# Patient Record
Sex: FEMALE | Race: BLACK OR AFRICAN AMERICAN | Employment: FULL TIME | URBAN - METROPOLITAN AREA
[De-identification: names, ages, dates, MRNs, and addresses within clinical notes are randomized per-mention and may not be internally consistent; named-entity substitution may affect disease eponyms.]

---

## 2018-03-06 ENCOUNTER — OFFICE VISIT (OUTPATIENT)
Dept: INTERNAL MEDICINE CLINIC | Age: 43
End: 2018-03-06

## 2018-03-06 VITALS
RESPIRATION RATE: 16 BRPM | HEIGHT: 68 IN | OXYGEN SATURATION: 96 % | DIASTOLIC BLOOD PRESSURE: 78 MMHG | BODY MASS INDEX: 34.69 KG/M2 | HEART RATE: 71 BPM | WEIGHT: 228.9 LBS | SYSTOLIC BLOOD PRESSURE: 130 MMHG | TEMPERATURE: 98.2 F

## 2018-03-06 DIAGNOSIS — E66.9 CLASS 1 OBESITY WITH SERIOUS COMORBIDITY AND BODY MASS INDEX (BMI) OF 34.0 TO 34.9 IN ADULT, UNSPECIFIED OBESITY TYPE: ICD-10-CM

## 2018-03-06 DIAGNOSIS — I10 ESSENTIAL HYPERTENSION: ICD-10-CM

## 2018-03-06 DIAGNOSIS — Z00.00 PHYSICAL EXAM: Primary | ICD-10-CM

## 2018-03-06 RX ORDER — HYDROCHLOROTHIAZIDE 25 MG/1
25 TABLET ORAL DAILY
Qty: 90 TAB | Refills: 3 | Status: SHIPPED | OUTPATIENT
Start: 2018-03-06 | End: 2021-01-18 | Stop reason: ALTCHOICE

## 2018-03-06 RX ORDER — HYDROCHLOROTHIAZIDE 25 MG/1
TABLET ORAL
Refills: 0 | COMMUNITY
Start: 2018-02-19 | End: 2018-03-06 | Stop reason: SDUPTHER

## 2018-03-06 NOTE — MR AVS SNAPSHOT
[address] [place] [id number] 
[phone] Patient: Pipe Thompson MRN: [id number] :1975 Visit Information Date & Time Provider Department Dept. Phone Encounter #  
 3/6/2018  2:30 PM Latanya Martinez NP [address] [facility] [id number] Follow-up Instructions Return in about 4 months (around 2018), or if symptoms worsen or fail to improve, for f/u 4 months for HTN/BP evaluation. Upcoming Health Maintenance Date Due DTaP/Tdap/Td series (1 - Tdap) 10/26/1998 PAP AKA CERVICAL CYTOLOGY 1/10/2021 Allergies as of 3/6/2018  Review Complete On: 3/6/2018 By: Latanya Martinez NP Severity Noted Reaction Type Reactions Shellfish Derived Medium 2013   Systemic Hives, Swelling Current Immunizations  Reviewed on 2013 No immunizations on file. Not reviewed this visit You Were Diagnosed With   
  
 Codes Comments Physical exam    -  Primary ICD-10-CM: Z00.00 ICD-9-CM: V70.9 Essential hypertension     ICD-10-CM: I10 
ICD-9-CM: 401.9 Class 1 obesity with serious comorbidity and body mass index (BMI) of 34.0 to 34.9 in adult, unspecified obesity type     ICD-10-CM: E66.9, Z68.34 
ICD-9-CM: 278.00, V85.34 Vitals BP Pulse Temp Resp Height(growth percentile) Weight(growth percentile) 130/78 (BP 1 Location: Left arm, BP Patient Position: Sitting) 71 98.2 °F (36.8 °C) (Oral) 16 5' 8\" (1.727 m) 228 lb 14.4 oz (103.8 kg) LMP SpO2 Breastfeeding? BMI OB Status Smoking Status 2018 96% No 34.8 kg/m2 Having regular periods Never Smoker Vitals History BMI and BSA Data Body Mass Index Body Surface Area 34.8 kg/m 2 2.23 m 2 Preferred Pharmacy Pharmacy Name Phone [id number] ENELIMA Ambriz  [address] [phone] Your Updated Medication List  
  
   
 This list is accurate as of 3/6/18  3:25 PM.  Always use your most recent med list.  
  
  
  
  
 hydroCHLOROthiazide 25 mg tablet Commonly known as:  HYDRODIURIL Take 1 Tab by mouth daily. prenatal multivit-ca-min-fe-fa Tab Take  by mouth. Indications: PREGNANCY  
  
 VITAMIN D3 1,000 unit Cap Generic drug:  cholecalciferol Take  by mouth. Indications: VITAMIN D DEFICIENCY Prescriptions Sent to Pharmacy Refills  
 hydroCHLOROthiazide (HYDRODIURIL) 25 mg tablet 3 Sig: Take 1 Tab by mouth daily. Class: Normal  
 Pharmacy: [id number] Vinny Ibarra [address] #: [phone] Route: Oral  
  
We Performed the Following REFERRAL TO WEIGHT LOSS [GXK307 Custom] Comments:  
 Evaluate for weight loss program  
  
Follow-up Instructions Return in about 4 months (around 7/6/2018), or if symptoms worsen or fail to improve, for f/u 4 months for HTN/BP evaluation. Referral Information Referral ID Referred By Referred To  
  
 [id number] CHANG Foreman MD   
   [address] [place] [address] Phone: [phone] Fax: [phone] Visits Status Start Date End Date 1 New Request 3/6/18 3/6/19 If your referral has a status of pending review or denied, additional information will be sent to support the outcome of this decision. Patient Instructions High Blood Pressure: Care Instructions Your Care Instructions If your blood pressure is usually above 140/90, you have high blood pressure, or hypertension. That means the top number is 140 or higher or the bottom number is 90 or higher, or both. Despite what a lot of people think, high blood pressure usually doesn't cause headaches or make you feel dizzy or lightheaded. It usually has no symptoms.  But it does increase your risk for heart attack, stroke, and kidney or eye damage. The higher your blood pressure, the more your risk increases. Your doctor will give you a goal for your blood pressure. Your goal will be based on your health and your age. An example of a goal is to keep your blood pressure below 140/90. Lifestyle changes, such as eating healthy and being active, are always important to help lower blood pressure. You might also take medicine to reach your blood pressure goal. 
Follow-up care is a key part of your treatment and safety. Be sure to make and go to all appointments, and call your doctor if you are having problems. It's also a good idea to know your test results and keep a list of the medicines you take. How can you care for yourself at home? Medical treatment · If you stop taking your medicine, your blood pressure will go back up. You may take one or more types of medicine to lower your blood pressure. Be safe with medicines. Take your medicine exactly as prescribed. Call your doctor if you think you are having a problem with your medicine. · Talk to your doctor before you start taking aspirin every day. Aspirin can help certain people lower their risk of a heart attack or stroke. But taking aspirin isn't right for everyone, because it can cause serious bleeding. · See your doctor regularly. You may need to see the doctor more often at first or until your blood pressure comes down. · If you are taking blood pressure medicine, talk to your doctor before you take decongestants or anti-inflammatory medicine, such as ibuprofen. Some of these medicines can raise blood pressure. · Learn how to check your blood pressure at home. Lifestyle changes · Stay at a healthy weight. This is especially important if you put on weight around the waist. Losing even 10 pounds can help you lower your blood pressure. · If your doctor recommends it, get more exercise. Walking is a good choice. Bit by bit, increase the amount you walk every day.  Try for at least 30 minutes on most days of the week. You also may want to swim, bike, or do other activities. · Avoid or limit alcohol. Talk to your doctor about whether you can drink any alcohol. · Try to limit how much sodium you eat to less than 2,300 milligrams (mg) a day. Your doctor may ask you to try to eat less than 1,500 mg a day. · Eat plenty of fruits (such as bananas and oranges), vegetables, legumes, whole grains, and low-fat dairy products. · Lower the amount of saturated fat in your diet. Saturated fat is found in animal products such as milk, cheese, and meat. Limiting these foods may help you lose weight and also lower your risk for heart disease. · Do not smoke. Smoking increases your risk for heart attack and stroke. If you need help quitting, talk to your doctor about stop-smoking programs and medicines. These can increase your chances of quitting for good. When should you call for help? Call 911 anytime you think you may need emergency care. This may mean having symptoms that suggest that your blood pressure is causing a serious heart or blood vessel problem. Your blood pressure may be over 180/110. ? For example, call 911 if: 
? · You have symptoms of a heart attack. These may include: ¨ Chest pain or pressure, or a strange feeling in the chest. 
¨ Sweating. ¨ Shortness of breath. ¨ Nausea or vomiting. ¨ Pain, pressure, or a strange feeling in the back, neck, jaw, or upper belly or in one or both shoulders or arms. ¨ Lightheadedness or sudden weakness. ¨ A fast or irregular heartbeat. ? · You have symptoms of a stroke. These may include: 
¨ Sudden numbness, tingling, weakness, or loss of movement in your face, arm, or leg, especially on only one side of your body. ¨ Sudden vision changes. ¨ Sudden trouble speaking. ¨ Sudden confusion or trouble understanding simple statements. ¨ Sudden problems with walking or balance. ¨ A sudden, severe headache that is different from past headaches. ? · You have severe back or belly pain. ?Do not wait until your blood pressure comes down on its own. Get help right away. ?Call your doctor now or seek immediate care if: 
? · Your blood pressure is much higher than normal (such as 180/110 or higher), but you don't have symptoms. ? · You think high blood pressure is causing symptoms, such as: ¨ Severe headache. ¨ Blurry vision. ? Watch closely for changes in your health, and be sure to contact your doctor if: 
? · Your blood pressure measures 140/90 or higher at least 2 times. That means the top number is 140 or higher or the bottom number is 90 or higher, or both. ? · You think you may be having side effects from your blood pressure medicine. ? · Your blood pressure is usually normal, but it goes above normal at least 2 times. Where can you learn more? Go to http://teofilo-shabana.info/. Enter A482 in the search box to learn more about \"High Blood Pressure: Care Instructions. \" Current as of: September 21, 2016 Content Version: 11.4 © 6309-6446 [employer]. Care instructions adapted under license by [employer] (which disclaims liability or warranty for this information). If you have questions about a medical condition or this instruction, always ask your healthcare professional. [address] any warranty or liability for your use of this information. Learning About Obesity What is obesity? Obesity means having so much body fat that your health is in danger. Having too much body fat can lead to type 2 diabetes, heart disease, high blood pressure, arthritis, sleep apnea, and stroke. Even if you don't feel bad now, think about these health risks. Do they seem like a good reason to start on a new path toward a healthier weight?  Or do you have another personal, powerful reason for wanting to lose weight? Whatever it is, keep it in mind. It can be hard to change eating habits and exercise habits. But with your own reason and plan, you can do it. How do you know if your weight is in the obesity range? To know if your weight is in the obesity range, your doctor looks at your body mass index (BMI) and waist size. Your BMI is a number that is calculated from your weight and your height. To figure your BMI for yourself, get a BMI table from your doctor or use an online tool, such as http://www.dietrich.[facility]/ on the [employer] of [employer]. What causes obesity? When you take in more calories than you burn off, you gain weight. How you eat, how active you are, and other things affect how your body uses calories and whether you gain weight. If you have family members who have too much body fat, you may have inherited a tendency to gain weight. And your family also helps form your eating and lifestyle habits, which can lead to obesity. Also, our busy lives make it harder to plan and cook healthy meals. For many of us, it's easier to reach for prepared foods, go out to eat, or go to the drive-through. But these foods are often high in saturated fat and calories. Portions are often too large. What can you do to reach a healthy weight? Focus on health, not diets. Diets are hard to stay on and don't work in the long run. It is very hard to stay with a diet that includes lots of big changes in your eating habits. Instead of a diet, focus on lifestyle changes that will improve your health and achieve the right balance of energy and calories. To lose weight, you need to burn more calories than you take in. You can do it by eating healthy foods in reasonable amounts and becoming more active, even a little bit every day. Making small changes over time can add up to a lot. Make a plan for change.  Many people have found that naming their reasons for change and staying focused on their plan can make a big difference. Work with your doctor to create a plan that is right for you. · Ask yourself: Vinh Posada are my personal, most powerful reasons for wanting this change? What will my life look like when I've made the change? \" · Set your long-term goal. Make it specific, such as \"I will lose x pounds. \" 
· Break your long-term goal into smaller, short-term goals. Make these small steps specific and within your reach, things you know you can do. These steps are what keep you going from day to day. How can you stay on your plan for change? Be ready. Choose to start during a time when there are few events that might trigger slip-ups, like holidays, social events, and high-stress periods. Decide on your first few steps. Most people have more success when they make small changes, one step at a time. For example, you might switch a daily candy bar to a piece of fruit, walk 10 minutes more, or add more vegetables to a meal. 
Line up your support people. Make sure you're not going to be alone as you make this change. Connect with people who understand how important it is to you. Ask family members and friends for help in keeping with your plan. And think about who could make it harder for you, and how to handle them. Try tracking. People who keep track of what they eat, feel, and do are better at losing weight. Try writing down things like: · What and how much you eat. · How you feel before and after each meal. 
· Details about each meal (like eating out or at home, eating alone, or with friends or family). · What you do to be active. Look and plan. As you track, look for patterns that you may want to change. Take note of: · When you eat and whether you skip meals. · How often you eat out. · How many fruits and vegetables you eat. · When you eat beyond feeling full. · When and why you eat for reasons other than being hungry. When you stray from your plan, don't get upset. Figure out what made you slip up and how you can fix it. Can you take medicines or have surgery to lose weight? Before your doctor will prescribe medicines or surgery, he or she will probably want you to be more active and follow your healthy eating plan for a period of time. These habits are key lifelong changes for managing your weight, with or without other medical treatment. And these changes can help you avoid weight-related health problems. Follow-up care is a key part of your treatment and safety. Be sure to make and go to all appointments, and call your doctor if you are having problems. It's also a good idea to know your test results and keep a list of the medicines you take. Where can you learn more? Go to http://teofilo-shabana.info/. Enter N111 in the search box to learn more about \"Learning About Obesity. \" Current as of: October 13, 2016 Content Version: 11.4 © 6562-2291 [employer]. Care instructions adapted under license by [employer] (which disclaims liability or warranty for this information). If you have questions about a medical condition or this instruction, always ask your healthcare professional. [place] [address] any warranty or liability for your use of this information. Well Visit, Ages 25 to 48: Care Instructions Your Care Instructions Physical exams can help you stay healthy. Your doctor has checked your overall health and may have suggested ways to take good care of yourself. He or she also may have recommended tests. At home, you can help prevent illness with healthy eating, regular exercise, and other steps. Follow-up care is a key part of your treatment and safety. Be sure to make and go to all appointments, and call your doctor if you are having problems. It's also a good idea to know your test results and keep a list of the medicines you take. How can you care for yourself at home? · Reach and stay at a healthy weight. This will lower your risk for many problems, such as obesity, diabetes, heart disease, and high blood pressure. · Get at least 30 minutes of physical activity on most days of the week. Walking is a good choice. You also may want to do other activities, such as running, swimming, cycling, or playing tennis or team sports. Discuss any changes in your exercise program with your doctor. · Do not smoke or allow others to smoke around you. If you need help quitting, talk to your doctor about stop-smoking programs and medicines. These can increase your chances of quitting for good. · Talk to your doctor about whether you have any risk factors for sexually transmitted infections (STIs). Having one sex partner (who does not have STIs and does not have sex with anyone else) is a good way to avoid these infections. · Use birth control if you do not want to have children at this time. Talk with your doctor about the choices available and what might be best for you. · Protect your skin from too much sun. When you're outdoors from 10 a.m. to 4 p.m., stay in the shade or cover up with clothing and a hat with a wide brim. Wear sunglasses that block UV rays. Even when it's cloudy, put broad-spectrum sunscreen (SPF 30 or higher) on any exposed skin. · See a dentist one or two times a year for checkups and to have your teeth cleaned. · Wear a seat belt in the car. · Drink alcohol in moderation, if at all. That means no more than 2 drinks a day for men and 1 drink a day for women. Follow your doctor's advice about when to have certain tests. These tests can spot problems early. For everyone · Cholesterol. Have the fat (cholesterol) in your blood tested after age 21. Your doctor will tell you how often to have this done based on your age, family history, or other things that can increase your risk for heart disease. · Blood pressure. Have your blood pressure checked during a routine doctor visit. Your doctor will tell you how often to check your blood pressure based on your age, your blood pressure results, and other factors. · Vision. Talk with your doctor about how often to have a glaucoma test. 
· Diabetes. Ask your doctor whether you should have tests for diabetes. · Colon cancer. Have a test for colon cancer at age 48. You may have one of several tests. If you are younger than 48, you may need a test earlier if you have any risk factors. Risk factors include whether you already had a precancerous polyp removed from your colon or whether your parent, brother, sister, or child has had colon cancer. For women · Breast exam and mammogram. Talk to your doctor about when you should have a clinical breast exam and a mammogram. Medical experts differ on whether and how often women under 50 should have these tests. Your doctor can help you decide what is right for you. · Pap test and pelvic exam. Begin Pap tests at age 24. A Pap test is the best way to find cervical cancer. The test often is part of a pelvic exam. Ask how often to have this test. 
· Tests for sexually transmitted infections (STIs). Ask whether you should have tests for STIs. You may be at risk if you have sex with more than one person, especially if your partners do not wear condoms. For men · Tests for sexually transmitted infections (STIs). Ask whether you should have tests for STIs. You may be at risk if you have sex with more than one person, especially if you do not wear a condom. · Testicular cancer exam. Ask your doctor whether you should check your testicles regularly. · Prostate exam. Talk to your doctor about whether you should have a blood test (called a PSA test) for prostate cancer.  Experts differ on whether and when men should have this test. Some experts suggest it if you are older than 39 and are -American or have a father or brother who got prostate cancer when he was younger than 72. When should you call for help? Watch closely for changes in your health, and be sure to contact your doctor if you have any problems or symptoms that concern you. Where can you learn more? Go to http://teofilo-shabana.info/. Enter P072 in the search box to learn more about \"Well Visit, Ages 25 to 48: Care Instructions. \" Current as of: May 12, 2017 Content Version: 11.4 © 3476-3297 [employer]. Care instructions adapted under license by [employer] (which disclaims liability or warranty for this information). If you have questions about a medical condition or this instruction, always ask your healthcare professional. [address] any warranty or liability for your use of this information. Introducing [facility]! Dear Nicci Munoz: Thank you for requesting a [employer] account. Our records indicate that you already have an active [employer] account. You can access your account anytime at [email or web address] Did you know that you can access your hospital and ER discharge instructions at any time in [employer]? You can also review all of your test results from your hospital stay or ER visit. Additional Information If you have questions, please visit the Frequently Asked Questions section of the [employer] website at [email or web address] Remember, [employer] is NOT to be used for urgent needs. For medical emergencies, dial 911. Now available from your iPhone and Android! Please provide this summary of care documentation to your next provider. Your primary care clinician is listed as Vicki Morse. If you have any questions after today's visit, please call [phone].

## 2018-03-06 NOTE — PATIENT INSTRUCTIONS
High Blood Pressure: Care Instructions  Your Care Instructions    If your blood pressure is usually above 140/90, you have high blood pressure, or hypertension. That means the top number is 140 or higher or the bottom number is 90 or higher, or both. Despite what a lot of people think, high blood pressure usually doesn't cause headaches or make you feel dizzy or lightheaded. It usually has no symptoms. But it does increase your risk for heart attack, stroke, and kidney or eye damage. The higher your blood pressure, the more your risk increases. Your doctor will give you a goal for your blood pressure. Your goal will be based on your health and your age. An example of a goal is to keep your blood pressure below 140/90. Lifestyle changes, such as eating healthy and being active, are always important to help lower blood pressure. You might also take medicine to reach your blood pressure goal.  Follow-up care is a key part of your treatment and safety. Be sure to make and go to all appointments, and call your doctor if you are having problems. It's also a good idea to know your test results and keep a list of the medicines you take. How can you care for yourself at home? Medical treatment  · If you stop taking your medicine, your blood pressure will go back up. You may take one or more types of medicine to lower your blood pressure. Be safe with medicines. Take your medicine exactly as prescribed. Call your doctor if you think you are having a problem with your medicine. · Talk to your doctor before you start taking aspirin every day. Aspirin can help certain people lower their risk of a heart attack or stroke. But taking aspirin isn't right for everyone, because it can cause serious bleeding. · See your doctor regularly. You may need to see the doctor more often at first or until your blood pressure comes down.   · If you are taking blood pressure medicine, talk to your doctor before you take decongestants or anti-inflammatory medicine, such as ibuprofen. Some of these medicines can raise blood pressure. · Learn how to check your blood pressure at home. Lifestyle changes  · Stay at a healthy weight. This is especially important if you put on weight around the waist. Losing even 10 pounds can help you lower your blood pressure. · If your doctor recommends it, get more exercise. Walking is a good choice. Bit by bit, increase the amount you walk every day. Try for at least 30 minutes on most days of the week. You also may want to swim, bike, or do other activities. · Avoid or limit alcohol. Talk to your doctor about whether you can drink any alcohol. · Try to limit how much sodium you eat to less than 2,300 milligrams (mg) a day. Your doctor may ask you to try to eat less than 1,500 mg a day. · Eat plenty of fruits (such as bananas and oranges), vegetables, legumes, whole grains, and low-fat dairy products. · Lower the amount of saturated fat in your diet. Saturated fat is found in animal products such as milk, cheese, and meat. Limiting these foods may help you lose weight and also lower your risk for heart disease. · Do not smoke. Smoking increases your risk for heart attack and stroke. If you need help quitting, talk to your doctor about stop-smoking programs and medicines. These can increase your chances of quitting for good. When should you call for help? Call 911 anytime you think you may need emergency care. This may mean having symptoms that suggest that your blood pressure is causing a serious heart or blood vessel problem. Your blood pressure may be over 180/110. ? For example, call 911 if:  ? · You have symptoms of a heart attack. These may include:  ¨ Chest pain or pressure, or a strange feeling in the chest.  ¨ Sweating. ¨ Shortness of breath. ¨ Nausea or vomiting.   ¨ Pain, pressure, or a strange feeling in the back, neck, jaw, or upper belly or in one or both shoulders or arms.  ¨ Lightheadedness or sudden weakness. ¨ A fast or irregular heartbeat. ? · You have symptoms of a stroke. These may include:  ¨ Sudden numbness, tingling, weakness, or loss of movement in your face, arm, or leg, especially on only one side of your body. ¨ Sudden vision changes. ¨ Sudden trouble speaking. ¨ Sudden confusion or trouble understanding simple statements. ¨ Sudden problems with walking or balance. ¨ A sudden, severe headache that is different from past headaches. ? · You have severe back or belly pain. ?Do not wait until your blood pressure comes down on its own. Get help right away. ?Call your doctor now or seek immediate care if:  ? · Your blood pressure is much higher than normal (such as 180/110 or higher), but you don't have symptoms. ? · You think high blood pressure is causing symptoms, such as:  ¨ Severe headache. ¨ Blurry vision. ? Watch closely for changes in your health, and be sure to contact your doctor if:  ? · Your blood pressure measures 140/90 or higher at least 2 times. That means the top number is 140 or higher or the bottom number is 90 or higher, or both. ? · You think you may be having side effects from your blood pressure medicine. ? · Your blood pressure is usually normal, but it goes above normal at least 2 times. Where can you learn more? Go to http://teofilo-shabana.info/. Enter K863 in the search box to learn more about \"High Blood Pressure: Care Instructions. \"  Current as of: September 21, 2016  Content Version: 11.4  © 4116-3935 [employer]. Care instructions adapted under license by [employer] (which disclaims liability or warranty for this information). If you have questions about a medical condition or this instruction, always ask your healthcare professional. [place] [address] any warranty or liability for your use of this information.        Learning About Obesity  What is obesity? Obesity means having so much body fat that your health is in danger. Having too much body fat can lead to type 2 diabetes, heart disease, high blood pressure, arthritis, sleep apnea, and stroke. Even if you don't feel bad now, think about these health risks. Do they seem like a good reason to start on a new path toward a healthier weight? Or do you have another personal, powerful reason for wanting to lose weight? Whatever it is, keep it in mind. It can be hard to change eating habits and exercise habits. But with your own reason and plan, you can do it. How do you know if your weight is in the obesity range? To know if your weight is in the obesity range, your doctor looks at your body mass index (BMI) and waist size. Your BMI is a number that is calculated from your weight and your height. To figure your BMI for yourself, get a BMI table from your doctor or use an online tool, such as http://www.dietrich.com/ on the [employer]. What causes obesity? When you take in more calories than you burn off, you gain weight. How you eat, how active you are, and other things affect how your body uses calories and whether you gain weight. If you have family members who have too much body fat, you may have inherited a tendency to gain weight. And your family also helps form your eating and lifestyle habits, which can lead to obesity. Also, our busy lives make it harder to plan and cook healthy meals. For many of us, it's easier to reach for prepared foods, go out to eat, or go to the drive-through. But these foods are often high in saturated fat and calories. Portions are often too large. What can you do to reach a healthy weight? Focus on health, not diets. Diets are hard to stay on and don't work in the long run. It is very hard to stay with a diet that includes lots of big changes in your eating habits.   Instead of a diet, focus on lifestyle changes that will improve your health and achieve the right balance of energy and calories. To lose weight, you need to burn more calories than you take in. You can do it by eating healthy foods in reasonable amounts and becoming more active, even a little bit every day. Making small changes over time can add up to a lot. Make a plan for change. Many people have found that naming their reasons for change and staying focused on their plan can make a big difference. Work with your doctor to create a plan that is right for you. · Ask yourself: Lary Dominguez are my personal, most powerful reasons for wanting this change? What will my life look like when I've made the change? \"  · Set your long-term goal. Make it specific, such as \"I will lose x pounds. \"  · Break your long-term goal into smaller, short-term goals. Make these small steps specific and within your reach, things you know you can do. These steps are what keep you going from day to day. How can you stay on your plan for change? Be ready. Choose to start during a time when there are few events that might trigger slip-ups, like holidays, social events, and high-stress periods. Decide on your first few steps. Most people have more success when they make small changes, one step at a time. For example, you might switch a daily candy bar to a piece of fruit, walk 10 minutes more, or add more vegetables to a meal.  Line up your support people. Make sure you're not going to be alone as you make this change. Connect with people who understand how important it is to you. Ask family members and friends for help in keeping with your plan. And think about who could make it harder for you, and how to handle them. Try tracking. People who keep track of what they eat, feel, and do are better at losing weight. Try writing down things like:  · What and how much you eat.   · How you feel before and after each meal.  · Details about each meal (like eating out or at home, eating alone, or with friends or family). · What you do to be active. Look and plan. As you track, look for patterns that you may want to change. Take note of:  · When you eat and whether you skip meals. · How often you eat out. · How many fruits and vegetables you eat. · When you eat beyond feeling full. · When and why you eat for reasons other than being hungry. When you stray from your plan, don't get upset. Figure out what made you slip up and how you can fix it. Can you take medicines or have surgery to lose weight? Before your doctor will prescribe medicines or surgery, he or she will probably want you to be more active and follow your healthy eating plan for a period of time. These habits are key lifelong changes for managing your weight, with or without other medical treatment. And these changes can help you avoid weight-related health problems. Follow-up care is a key part of your treatment and safety. Be sure to make and go to all appointments, and call your doctor if you are having problems. It's also a good idea to know your test results and keep a list of the medicines you take. Where can you learn more? Go to http://teofilo-shabana.info/. Enter N111 in the search box to learn more about \"Learning About Obesity. \"  Current as of: October 13, 2016  Content Version: 11.4  © 4764-1087 [employer]. Care instructions adapted under license by [employer] (which disclaims liability or warranty for this information). If you have questions about a medical condition or this instruction, always ask your healthcare professional. [place] [address] any warranty or liability for your use of this information. Well Visit, Ages 25 to 48: Care Instructions  Your Care Instructions    Physical exams can help you stay healthy. Your doctor has checked your overall health and may have suggested ways to take good care of yourself.  He or she also may have recommended tests. At home, you can help prevent illness with healthy eating, regular exercise, and other steps. Follow-up care is a key part of your treatment and safety. Be sure to make and go to all appointments, and call your doctor if you are having problems. It's also a good idea to know your test results and keep a list of the medicines you take. How can you care for yourself at home? · Reach and stay at a healthy weight. This will lower your risk for many problems, such as obesity, diabetes, heart disease, and high blood pressure. · Get at least 30 minutes of physical activity on most days of the week. Walking is a good choice. You also may want to do other activities, such as running, swimming, cycling, or playing tennis or team sports. Discuss any changes in your exercise program with your doctor. · Do not smoke or allow others to smoke around you. If you need help quitting, talk to your doctor about stop-smoking programs and medicines. These can increase your chances of quitting for good. · Talk to your doctor about whether you have any risk factors for sexually transmitted infections (STIs). Having one sex partner (who does not have STIs and does not have sex with anyone else) is a good way to avoid these infections. · Use birth control if you do not want to have children at this time. Talk with your doctor about the choices available and what might be best for you. · Protect your skin from too much sun. When you're outdoors from 10 a.m. to 4 p.m., stay in the shade or cover up with clothing and a hat with a wide brim. Wear sunglasses that block UV rays. Even when it's cloudy, put broad-spectrum sunscreen (SPF 30 or higher) on any exposed skin. · See a dentist one or two times a year for checkups and to have your teeth cleaned. · Wear a seat belt in the car. · Drink alcohol in moderation, if at all. That means no more than 2 drinks a day for men and 1 drink a day for women.   Follow your doctor's advice about when to have certain tests. These tests can spot problems early. For everyone  · Cholesterol. Have the fat (cholesterol) in your blood tested after age 21. Your doctor will tell you how often to have this done based on your age, family history, or other things that can increase your risk for heart disease. · Blood pressure. Have your blood pressure checked during a routine doctor visit. Your doctor will tell you how often to check your blood pressure based on your age, your blood pressure results, and other factors. · Vision. Talk with your doctor about how often to have a glaucoma test.  · Diabetes. Ask your doctor whether you should have tests for diabetes. · Colon cancer. Have a test for colon cancer at age 48. You may have one of several tests. If you are younger than 48, you may need a test earlier if you have any risk factors. Risk factors include whether you already had a precancerous polyp removed from your colon or whether your parent, brother, sister, or child has had colon cancer. For women  · Breast exam and mammogram. Talk to your doctor about when you should have a clinical breast exam and a mammogram. Medical experts differ on whether and how often women under 50 should have these tests. Your doctor can help you decide what is right for you. · Pap test and pelvic exam. Begin Pap tests at age 24. A Pap test is the best way to find cervical cancer. The test often is part of a pelvic exam. Ask how often to have this test.  · Tests for sexually transmitted infections (STIs). Ask whether you should have tests for STIs. You may be at risk if you have sex with more than one person, especially if your partners do not wear condoms. For men  · Tests for sexually transmitted infections (STIs). Ask whether you should have tests for STIs. You may be at risk if you have sex with more than one person, especially if you do not wear a condom.   · Testicular cancer exam. Ask your doctor whether you should check your testicles regularly. · Prostate exam. Talk to your doctor about whether you should have a blood test (called a PSA test) for prostate cancer. Experts differ on whether and when men should have this test. Some experts suggest it if you are older than 39 and are -American or have a father or brother who got prostate cancer when he was younger than 72. When should you call for help? Watch closely for changes in your health, and be sure to contact your doctor if you have any problems or symptoms that concern you. Where can you learn more? Go to http://teofilo-shabana.info/. Enter P072 in the search box to learn more about \"Well Visit, Ages 25 to 48: Care Instructions. \"  Current as of: May 12, 2017  Content Version: 11.4  © 1891-6175 Healthwise, Incorporated. Care instructions adapted under license by [employer] (which disclaims liability or warranty for this information). If you have questions about a medical condition or this instruction, always ask your healthcare professional. [place] [address] any warranty or liability for your use of this information.

## 2018-03-06 NOTE — PROGRESS NOTES
Chief Complaint   Patient presents with    Establish Care    Hypertension     1. Have you been to the ER, urgent care clinic since your last visit? Hospitalized since your last visit? No    2. Have you seen or consulted any other health care providers outside of the [address] [place] [address] since your last visit? Include any pap smears or colon screening.  No

## 2018-03-06 NOTE — PROGRESS NOTES
Establish Care; Hypertension; and Medication Refill       Subjective:   HPI     38year old patient presents as new to this practice to establish care and refill of HCTZ medication. Had problems getting in to see several doctors. PCP is Dr. Casandra Navarro at [facility] in [place], [place]. Hypertension Review:  The patient has essential hypertension  Diet and Lifestyle: generally follows a  low sodium diet, exercises rarely. Home BP Monitoring: is not measured at home. Pertinent ROS: taking medications as instructed, no medication side effects noted, no TIA's, no chest pain on exertion, no dyspnea on exertion, no swelling of ankles. She reports the following history and medical concerns:        Past Medical History:   Diagnosis Date    Abnormal Pap smear      repeat WNL    Essential hypertension     chronic hypertension    Sickle cell trait (HCC)     Sickle-cell disease, unspecified     pt and  has trait, fetus does not per pt       Past Surgical History:   Procedure Laterality Date     DELIVERY ONLY      - fetal distress    HX HERNIA REPAIR             Prior to Admission medications    Medication Sig Start Date End Date Taking? Authorizing Provider   hydroCHLOROthiazide (HYDRODIURIL) 25 mg tablet Take 1 Tab by mouth daily. 3/6/18  Yes Rhonda Chavez NP   prenatal multivit-ca-min-fe-fa Tab Take  by mouth. Indications: PREGNANCY   Yes Historical Provider   Cholecalciferol, Vitamin D3, (VITAMIN D3) 1,000 unit cap Take  by mouth. Indications: VITAMIN D DEFICIENCY   Yes Historical Provider         Allergies   Allergen Reactions    Shellfish Derived Hives and Swelling        Social History     Social History    Marital status:      Spouse name: N/A    Number of children: N/A    Years of education: N/A     Occupational History    Not on file.      Social History Main Topics    Smoking status: Never Smoker    Smokeless tobacco: Never Used    Alcohol use 0.6 oz/week 1 Glasses of wine per week    Drug use: No    Sexual activity: Yes     Partners: Male     Birth control/ protection: None     Other Topics Concern    Not on file     Social History Narrative        Family History   Problem Relation Age of Onset    Alcohol abuse Mother     Arthritis-osteo Father     Elevated Lipids Father     Heart Disease Father     Hypertension Father     Cancer Maternal Aunt         Review of Systems   Constitutional: Negative for chills, fever and malaise/fatigue. HENT: Negative for congestion, ear discharge, ear pain, hearing loss, nosebleeds, sinus pain, sore throat and tinnitus. Eyes: Negative for blurred vision, pain, discharge and redness. Respiratory: Negative for cough and shortness of breath. Cardiovascular: Negative for chest pain, palpitations and leg swelling. Gastrointestinal: Negative for abdominal pain, diarrhea, nausea and vomiting. Genitourinary: Negative for dysuria. Musculoskeletal: Negative for myalgias. Skin: Negative for rash. Neurological: Negative for dizziness, tingling, weakness and headaches. Psychiatric/Behavioral: Negative for depression. Assessment/ Plan:     Vitals:    03/06/18 1441   BP: 130/78   Pulse: 71   Resp: 16   Temp: 98.2 °F (36.8 °C)   TempSrc: Oral   SpO2: 96%   Weight: 228 lb 14.4 oz (103.8 kg)   Height: 5' 8\" (1.727 m)   PainSc:   3   PainLoc: Ankle   LMP: 03/03/2018        Physical Exam   Constitutional: She is oriented to person, place, and time. She appears well-nourished. obese   HENT:   Head: Normocephalic and atraumatic. Right Ear: External ear normal.   Left Ear: External ear normal.   Nose: Nose normal.   Mouth/Throat: Oropharynx is clear and moist. No oropharyngeal exudate. Eyes: Conjunctivae are normal. Pupils are equal, round, and reactive to light. Right eye exhibits no discharge. Left eye exhibits no discharge. Neck: Normal range of motion. Neck supple. No thyromegaly present. Cardiovascular: Normal rate, regular rhythm, normal heart sounds and intact distal pulses. Pulmonary/Chest: Effort normal and breath sounds normal.   Abdominal: Soft. Bowel sounds are normal.   Musculoskeletal: Normal range of motion. Lymphadenopathy:     She has no cervical adenopathy. Neurological: She is alert and oriented to person, place, and time. She has normal reflexes. She displays normal reflexes. No cranial nerve deficit. She exhibits normal muscle tone. Coordination normal.   Skin: Skin is warm and dry. Psychiatric: She has a normal mood and affect. Nursing note and vitals reviewed. ICD-10-CM ICD-9-CM    1. Physical exam Z00.00 V70.9    2. Essential hypertension I10 401.9 hydroCHLOROthiazide (HYDRODIURIL) 25 mg tablet   3. Class 1 obesity with serious comorbidity and body mass index (BMI) of 34.0 to 34.9 in adult, unspecified obesity type E66.9 278.00 REFERRAL TO WEIGHT LOSS    Z68.34 V85.34         Orders Placed This Encounter    REFERRAL TO WEIGHT LOSS     Referral Priority:   Routine     Referral Type:   Consultation     Referral Reason:   Specialty Services Required     Referred to Provider:   Emiliana Rocha MD    DISCONTD: hydroCHLOROthiazide (HYDRODIURIL) 25 mg tablet     Sig: TAKE 1 TABLET EVERY DAY     Refill:  0    hydroCHLOROthiazide (HYDRODIURIL) 25 mg tablet     Sig: Take 1 Tab by mouth daily. Dispense:  90 Tab     Refill:  3        Assessment/ Plan:     I have reviewed the patient's medical history in detail and updated the computerized patient record. HCTZ refill. Referral to Weight Loss Program.  We had a prolonged discussion about these complex clinical issues and went over the various important aspects to consider. All questions were answered. Advised her to call back,  return to office, or go to ER if symptoms do not improve, change in nature, or persist, otherwise f/u in 4 months for HTN/BP.     She was given an after visit summary or informed of [employer] Access which includes patient instructions, diagnoses, current medications, & vitals. She expressed understanding with the diagnosis and plan and was given the opportunity to ask questions.      Tevin Reyes, DNP

## 2018-07-31 ENCOUNTER — OFFICE VISIT (OUTPATIENT)
Dept: FAMILY MEDICINE CLINIC | Age: 43
End: 2018-07-31

## 2018-07-31 VITALS
HEIGHT: 68 IN | WEIGHT: 226.5 LBS | RESPIRATION RATE: 19 BRPM | DIASTOLIC BLOOD PRESSURE: 85 MMHG | BODY MASS INDEX: 34.33 KG/M2 | SYSTOLIC BLOOD PRESSURE: 136 MMHG | OXYGEN SATURATION: 98 % | TEMPERATURE: 98.9 F | HEART RATE: 66 BPM

## 2018-07-31 DIAGNOSIS — I10 ESSENTIAL HYPERTENSION: Primary | ICD-10-CM

## 2018-07-31 DIAGNOSIS — E66.9 OBESITY (BMI 30-39.9): ICD-10-CM

## 2018-07-31 RX ORDER — UREA 10 %
100 LOTION (ML) TOPICAL DAILY
COMMUNITY
End: 2021-01-18 | Stop reason: ALTCHOICE

## 2018-07-31 NOTE — MR AVS SNAPSHOT
[address] [facility] [address] 
[phone] Patient: Domingo Alfonso MRN: [id number] :1975 Visit Information Date & Time Provider Department Dept. Phone Encounter #  
 2018  2:00 PM Trisha Flores MD [address] [place] [address] [id number] Follow-up Instructions Return in about 2 weeks (around 2018). Your Appointments 10/5/2018  9:30 AM  
New Patient with Trisha Flores MD  
. James Galindo [address] [place] [address]) Appt Note: Weight Loss [place] [phone]  
[place] [address] Upcoming Health Maintenance Date Due DTaP/Tdap/Td series (1 - Tdap) 10/26/1998 Influenza Age 5 to Adult 2018 PAP AKA CERVICAL CYTOLOGY 1/10/2021 Allergies as of 2018  Review Complete On: 2018 By: Trisha Flores MD  
  
 Severity Noted Reaction Type Reactions Shellfish Derived Medium 2013   Systemic Hives, Swelling Current Immunizations  Reviewed on 2013 No immunizations on file. Not reviewed this visit You Were Diagnosed With   
  
 Codes Comments Essential hypertension    -  Primary ICD-10-CM: I10 
ICD-9-CM: 401.9 Obesity (BMI 30-39. 9)     ICD-10-CM: E66.9 ICD-9-CM: 278.00 Vitals BP Pulse Temp Resp Height(growth percentile) Weight(growth percentile) 136/85 66 98.9 °F (37.2 °C) (Oral) 19 5' 8\" (1.727 m) 226 lb 8 oz (102.7 kg) LMP SpO2 BMI OB Status Smoking Status 2018 98% 34.44 kg/m2 Having regular periods Never Smoker Vitals History BMI and BSA Data Body Mass Index Body Surface Area 34.44 kg/m 2 2.22 m 2 Preferred Pharmacy Pharmacy Name Phone [id number] [place] - [address] [facility] [phone] Your Updated Medication List  
  
   
 This list is accurate as of 7/31/18  3:00 PM.  Always use your most recent med list.  
  
  
  
  
 hydroCHLOROthiazide 25 mg tablet Commonly known as:  HYDRODIURIL Take 1 Tab by mouth daily. prenatal multivit-ca-min-fe-fa Tab Take  by mouth. Indications: PREGNANCY  
  
 VITAMIN B-12 100 mcg tablet Generic drug:  cyanocobalamin Take 100 mcg by mouth daily. VITAMIN D3 1,000 unit Cap Generic drug:  cholecalciferol Take  by mouth. Indications: VITAMIN D DEFICIENCY Follow-up Instructions Return in about 2 weeks (around 8/14/2018). Introducing [facility]! Dear Wayne Hernandez: Thank you for requesting a [employer] account. Our records indicate that you already have an active [email or web address] account. You can access your account anytime at [email or web address] Did you know that you can access your hospital and ER discharge instructions at any time in [employer]? You can also review all of your test results from your hospital stay or ER visit. Additional Information If you have questions, please visit the Frequently Asked Questions section of the [email or web address] website at [email or web address] Remember, [email or web address] is NOT to be used for urgent needs. For medical emergencies, dial 911. Now available from your iPhone and Android! Please provide this summary of care documentation to your next provider. Your primary care clinician is listed as Tessy Ford. If you have any questions after today's visit, please call [phone].

## 2018-07-31 NOTE — PROGRESS NOTES
Weight Loss Progress Note: Initial Physician Visit Neo Goff is a 38 y.o. female with BMI   34.44 who is here for her Initial Evaluation for the medical bariatric care. CC: I want to get off bp med Here for weight maintenance She was refrred to me in order to get off of the HCTZ She gained a lot after she had the emplanon implanted She had it taken out because of the weight gain Her current job requires a lot more walking She has lost 7 pounds in 3 weeks since she started this job She sleeps about 7.5-8 hrs Weight History Current weight 226and BMI is Body mass index is 34.44 kg/(m^2). Goal weight 170 Highest weight 226 (See weight gain time line scanned into media section of chart) Weight loss History How many weight loss attempts have you had?  none Which program were you most successful doing?  na 
 
Significant Medical History Have you ever taken appetite suppressants? no If yes: Rx or OTC? If yes; Any negative side effects? Ever diagnosed with sleep apnea or put on CPAP no problem w sleep Ever had bariatric surgery: no 
 
Pregnant or planning on becoming pregnant w/in 6 months: no 
 
 
 
Significant Psychosocial History Any history of drug abuse or dependence: no 
Current Major Lifestyle Changes: no Any potential unsupportive people: no  
History of binge eating disorder or anorexia : no If yes, are you currently being treated no Social History Social History Substance Use Topics  Smoking status: Never Smoker  Smokeless tobacco: Never Used  Alcohol use 0.6 oz/week 1 Glasses of wine per week How many times a week do you eat out? Has stopped 2 weeks ago Do you drink any EtOH?  no 
 If so, how much? Do you have upcoming any travel in the next 6 weeks?  no 
 If so, what do you have planned? Exercise How many days a week do you currently exercise? 5 days Have you ever been told by a physician not to exercise? no 
 
 
Objective Visit Vitals  /85  Pulse 66  Temp 98.9 °F (37.2 °C) (Oral)  Resp 19  
 Ht 5' 8\" (1.727 m)  Wt 226 lb 8 oz (102.7 kg)  LMP 07/09/2018  SpO2 98%  BMI 34.44 kg/m2 Waist Circumference: See Weight Management Doc Flowsheet Neck Circumference: See Weight Management Doc Flowsheet Percent Body Fat: See Weight Management Doc Flowsheet Weight Metrics 7/31/2018 7/31/2018 3/6/2018 2/25/2013 2/22/2013 Weight - 226 lb 8 oz 228 lb 14.4 oz 226 lb 226 lb Neck Circ (inches) 15 - - - - Waist Measure Inches 42 - - - - Exercise Mins/week 150 - - - -  
BMI - 34.44 kg/m2 34.8 kg/m2 33.86 kg/m2 34.37 kg/m2 Labs:  
 
Review of Systems Complete ROS negative except where noted above Physical Exam 
 
Vital Signs Reviewed Weight Management Metrics Reviewed Vital Signs Reviewed Appearance: Obese, A&O x 3, NAD HEENT:  NC/AT, EOMI, PERRL, No scleral icterus, malampatti score:4 
Skin:  
 Skin tags - no Acanthosis Nigricans - no Neck:  No nodes, thyromegaly no Heart:  RRR without M/R/G Lungs:  CTAB, no rhonchi, rales, or wheezes with good air exchange Abdomen:  Non-tender, pos bowel sounds; hepatomegaly - no Ext:  No C/C/E Assessment & Plan Diagnoses and all orders for this visit: 1. Essential hypertension She wants to control the BP by losing weight 2. Obesity (BMI 30-39. 9) Diet Plan: given the LCD, low carb-bariatrix plan Activity Plan: cont the 30 min walk 5 days a week I suggest add resistance Medication Plan: none today Based on his history and exam, Chantale Nowak is a good candidate for the Non-[facility] Weight Loss Program  
 
 
 
 
There are no Patient Instructions on file for this visit. Follow-up Disposition: 
Return in about 2 weeks (around 8/14/2018).  
 
Over 50% of the *30 minutes face to face time with Main Parsons consisted of counseling & coordinating and/or discussing treatment plans in reference to her obesity The primary encounter diagnosis was Essential hypertension. A diagnosis of Obesity (BMI 30-39. 9) was also pertinent to this visit.

## 2018-07-31 NOTE — PROGRESS NOTES
1. Have you been to the ER, urgent care clinic since your last visit? Hospitalized since your last visit? No 
 
2. Have you seen or consulted any other health care providers outside of the [address] [place] [address] since your last visit? Include any pap smears or colon screening. No  
 
Chief Complaint Patient presents with  Weight Management Body Weight: 226.5 Body Fat%: 39.7 Muscle Mass Weight: 34.4 Body Water Weight: 44.6 Basal Metabolic Rate: 1947 BMI: 34.44

## 2018-09-24 ENCOUNTER — ED HISTORICAL/CONVERTED ENCOUNTER (OUTPATIENT)
Dept: OTHER | Age: 43
End: 2018-09-24

## 2018-10-31 ENCOUNTER — ED HISTORICAL/CONVERTED ENCOUNTER (OUTPATIENT)
Dept: OTHER | Age: 43
End: 2018-10-31

## 2018-12-06 ENCOUNTER — TELEPHONE (OUTPATIENT)
Dept: FAMILY MEDICINE CLINIC | Age: 43
End: 2018-12-06

## 2018-12-06 NOTE — TELEPHONE ENCOUNTER
----- Message from Alton Santos sent at 12/5/2018  2:28 PM EST -----  Regarding: FW: Dr. Skye Valladares      ----- Message -----  From: Arianna IVERSON  Sent: 12/5/2018  10:36 AM  To: [facility] Front Office  Subject: Dr. Brittany Johnson Telephone                             Pt requesting a her \"Hydrachlorathiazide\" be changed to a different medication because of the recall. [facility] pharmacy on file. Pt best contact number is [phone].

## 2018-12-06 NOTE — TELEPHONE ENCOUNTER
Spoke with pt and advised per Dr. Nick Peters her medication HCTZ is not on recall. The recall is stating the wrong medication was in the bottle labeled HCTZ. Advised to contact pharmacy and verify she has correct medication. Pt verbalized understanding and no further questions.

## 2019-08-16 LAB — MAMMOGRAPHY, EXTERNAL: NORMAL

## 2020-03-12 ENCOUNTER — ED HISTORICAL/CONVERTED ENCOUNTER (OUTPATIENT)
Dept: OTHER | Age: 45
End: 2020-03-12

## 2020-06-04 LAB — CREATININE, EXTERNAL: 0.83

## 2020-10-15 ENCOUNTER — TELEPHONE (OUTPATIENT)
Dept: INTERNAL MEDICINE CLINIC | Age: 45
End: 2020-10-15

## 2020-10-15 RX ORDER — HYDROCHLOROTHIAZIDE 25 MG/1
25 TABLET ORAL
Qty: 30 TAB | Refills: 0 | Status: SHIPPED | OUTPATIENT
Start: 2020-10-15 | End: 2020-11-05 | Stop reason: SDUPTHER

## 2020-10-15 NOTE — TELEPHONE ENCOUNTER
Dr. Alto Nissen pt called need refill on     HYDROCHLOROTHIAZIDE 25MG 1 PER DAY  Sent to [facility].     Patient completely out mix up at pharmacy   And needs to take before eating    lov 05/27/2020  Nov 12/02/2020

## 2020-11-05 ENCOUNTER — TELEPHONE (OUTPATIENT)
Dept: INTERNAL MEDICINE CLINIC | Age: 45
End: 2020-11-05

## 2020-11-05 DIAGNOSIS — I10 ESSENTIAL HYPERTENSION: Primary | ICD-10-CM

## 2020-11-05 RX ORDER — HYDROCHLOROTHIAZIDE 25 MG/1
25 TABLET ORAL
Qty: 90 TAB | Refills: 0 | Status: SHIPPED | OUTPATIENT
Start: 2020-11-05 | End: 2020-12-03

## 2020-11-05 NOTE — TELEPHONE ENCOUNTER
Patient called requesting a refill for the following prescription:      Hydrochlorothiazide 25 mg tablet

## 2020-11-30 ENCOUNTER — TELEPHONE (OUTPATIENT)
Dept: INTERNAL MEDICINE CLINIC | Age: 45
End: 2020-11-30

## 2020-11-30 ENCOUNTER — NURSE TRIAGE (OUTPATIENT)
Dept: INTERNAL MEDICINE CLINIC | Age: 45
End: 2020-11-30

## 2020-11-30 DIAGNOSIS — T78.40XD ALLERGY, SUBSEQUENT ENCOUNTER: Primary | ICD-10-CM

## 2020-11-30 RX ORDER — MONTELUKAST SODIUM 10 MG/1
10 TABLET ORAL DAILY
Qty: 90 TAB | Refills: 1 | Status: SHIPPED | OUTPATIENT
Start: 2020-11-30 | End: 2021-01-18 | Stop reason: ALTCHOICE

## 2020-12-03 RX ORDER — HYDROCHLOROTHIAZIDE 25 MG/1
TABLET ORAL
Qty: 90 TAB | Refills: 0 | Status: SHIPPED | OUTPATIENT
Start: 2020-12-03 | End: 2021-01-05

## 2020-12-07 RX ORDER — POTASSIUM CHLORIDE 750 MG/1
TABLET, FILM COATED, EXTENDED RELEASE ORAL
Qty: 60 TAB | Refills: 0 | Status: SHIPPED | OUTPATIENT
Start: 2020-12-07 | End: 2021-01-18 | Stop reason: ALTCHOICE

## 2021-01-05 RX ORDER — HYDROCHLOROTHIAZIDE 25 MG/1
TABLET ORAL
Qty: 90 TAB | Refills: 0 | Status: SHIPPED | OUTPATIENT
Start: 2021-01-05 | End: 2021-01-18 | Stop reason: ALTCHOICE

## 2021-01-07 VITALS
TEMPERATURE: 98.8 F | SYSTOLIC BLOOD PRESSURE: 127 MMHG | HEIGHT: 69 IN | DIASTOLIC BLOOD PRESSURE: 85 MMHG | BODY MASS INDEX: 34.07 KG/M2 | WEIGHT: 230 LBS | HEART RATE: 81 BPM | OXYGEN SATURATION: 99 %

## 2021-01-07 PROBLEM — I10 HYPERTENSIVE DISORDER: Status: ACTIVE | Noted: 2021-01-07

## 2021-01-07 RX ORDER — SULFAMETHOXAZOLE AND TRIMETHOPRIM 800; 160 MG/1; MG/1
1 TABLET ORAL 2 TIMES DAILY
COMMUNITY
End: 2021-01-18 | Stop reason: ALTCHOICE

## 2021-01-07 RX ORDER — NAPROXEN 500 MG/1
500 TABLET ORAL 2 TIMES DAILY WITH MEALS
COMMUNITY
End: 2021-01-18 | Stop reason: ALTCHOICE

## 2021-01-07 RX ORDER — IBUPROFEN 400 MG/1
TABLET ORAL
COMMUNITY
End: 2021-01-18 | Stop reason: ALTCHOICE

## 2021-01-07 RX ORDER — CIMETIDINE 400 MG/1
400 TABLET, FILM COATED ORAL 2 TIMES DAILY
COMMUNITY
End: 2021-01-18 | Stop reason: ALTCHOICE

## 2021-01-07 RX ORDER — HYDROXYZINE 25 MG/1
TABLET, FILM COATED ORAL
COMMUNITY
End: 2021-01-18 | Stop reason: ALTCHOICE

## 2021-01-07 RX ORDER — FLUCONAZOLE 150 MG/1
150 TABLET ORAL DAILY
COMMUNITY
End: 2021-01-18 | Stop reason: ALTCHOICE

## 2021-01-07 RX ORDER — CETIRIZINE HCL 10 MG
TABLET ORAL
COMMUNITY
End: 2021-01-18 | Stop reason: ALTCHOICE

## 2021-01-07 RX ORDER — METRONIDAZOLE 7.5 MG/G
GEL VAGINAL
COMMUNITY
End: 2021-01-18 | Stop reason: ALTCHOICE

## 2021-01-07 RX ORDER — SECNIDAZOLE 2 G/4.8G
GRANULE ORAL
COMMUNITY
End: 2021-01-18 | Stop reason: ALTCHOICE

## 2021-01-07 RX ORDER — AMOXICILLIN 500 MG/1
500 CAPSULE ORAL
COMMUNITY
End: 2021-01-18 | Stop reason: ALTCHOICE

## 2021-01-07 RX ORDER — TRIAMCINOLONE ACETONIDE 1 MG/G
CREAM TOPICAL 2 TIMES DAILY
COMMUNITY
End: 2021-01-18 | Stop reason: ALTCHOICE

## 2021-01-07 RX ORDER — BROMPHENIRAMINE MALEATE, PSEUDOEPHEDRINE HYDROCHLORIDE, AND DEXTROMETHORPHAN HYDROBROMIDE 2; 30; 10 MG/5ML; MG/5ML; MG/5ML
SYRUP ORAL
COMMUNITY
End: 2021-01-18 | Stop reason: ALTCHOICE

## 2021-01-18 ENCOUNTER — OFFICE VISIT (OUTPATIENT)
Dept: INTERNAL MEDICINE CLINIC | Age: 46
End: 2021-01-18
Payer: COMMERCIAL

## 2021-01-18 VITALS
BODY MASS INDEX: 35.34 KG/M2 | WEIGHT: 233.2 LBS | DIASTOLIC BLOOD PRESSURE: 82 MMHG | HEIGHT: 68 IN | TEMPERATURE: 98.6 F | RESPIRATION RATE: 18 BRPM | HEART RATE: 71 BPM | SYSTOLIC BLOOD PRESSURE: 124 MMHG | OXYGEN SATURATION: 99 %

## 2021-01-18 DIAGNOSIS — N92.0 MENORRHAGIA WITH REGULAR CYCLE: ICD-10-CM

## 2021-01-18 DIAGNOSIS — B35.1 ONYCHOMYCOSIS: ICD-10-CM

## 2021-01-18 DIAGNOSIS — I10 ESSENTIAL HYPERTENSION: Primary | ICD-10-CM

## 2021-01-18 DIAGNOSIS — J30.2 SEASONAL ALLERGIC RHINITIS, UNSPECIFIED TRIGGER: ICD-10-CM

## 2021-01-18 PROCEDURE — 99213 OFFICE O/P EST LOW 20 MIN: CPT | Performed by: INTERNAL MEDICINE

## 2021-01-18 NOTE — PROGRESS NOTES
Sonya Nguyen is a 39 y.o. female and presents with Follow-up and Hypertension  Patient presents for follow-up 39years of age sees OB/GYN for Brianna Gomez menorrhagia does not have uterine fibroid she says but is on iron pills vitamin D and prenatal vitamins for such and is doing better she has some onychomycosis of the 2 small toes middle right foot but has toenail polish on a nail advised her to try to take the toenail polish off and use Vicks VapoRub for the next few months to see if it would help we discussed antifungal oral agents but this needs to be monitored in regards to liver toxicity she wanted to try the topical agent for she continues on potassium chloride hydrochlorothiazide 25 mg a day hydroxyzine as needed montelukast for seasonal allergies no longer takes H2 blockers but Rolaids when she did take spicy tomato-based foods for acid reflux which she has not had significant trouble with she is overweight for height has had fluctuating blood pressures in the past but today blood pressure resting stable last labs in June of last year yielded a white count of 9900 hemoglobin 11.8 platelet count 925,903 cholesterol quite good at 183 and these were shared with the patient TSH 2.22 she was agreeable since she is doing well to hold off on labs until next visit we will try to lose weight we discussed preventive care issues           Review of Systems  Constitutional: negative for fevers, chills, anorexia, weight loss and fatigue,no insomnia  Eyes:   negative for visual disturbance and irritation, eye discharge, eye pain. no eye redness. ENT:   negative for tinnitus, sore throat, nasal congestion, ear pain, hoarseness, hearing loss.,no snoring.   Respiratory:  negative for cough, hemoptysis, shortness of breath, wheezing,  CV:   negative for chest pain, palpitations, lower extremity edema, shortness of breath while sitting, walking or at night  GI:   negative for nausea, vomiting, diarrhea, abdominal pain,melena,constipation. Endo:               negative for polyuria, polydipsia, polyphagia, cold or heat intolerance,hair loss. Genitourinary: negative for frequency, dysuria and hematuria,urethral discharge,nocturia.straining while urination,urinary incontinence. Integument:  negative for rash and pruritus-toenail onycomycosis  Hematologic:  negative for easy bruising and gum/nose bleeding, enlarged nodes  Musculoskel: negative for myalgias, arthralgias, back pain, muscle weakness, joint pain, h/o fall,cramps,calf pain. Neurological:  negative for headaches, dizziness, vertigo, memory problems, gait and seizures loss of consciousness,no ataxia. Behavl/Psych: negative for feelings of anxiety, depression, mood changes ,sadness    Past Medical History:   Diagnosis Date    Abnormal Pap smear      repeat WNL    Allergies     Anemia     Essential hypertension     chronic hypertension    Sickle cell trait (HCC)     Sickle-cell disease, unspecified     pt and  has trait, fetus does not per pt     Past Surgical History:   Procedure Laterality Date    HX HERNIA REPAIR          IN  DELIVERY ONLY      - fetal distress     Social History     Socioeconomic History    Marital status:      Spouse name: Not on file    Number of children: Not on file    Years of education: Not on file    Highest education level: Not on file   Tobacco Use    Smoking status: Never Smoker    Smokeless tobacco: Never Used   Substance and Sexual Activity    Alcohol use:  Yes     Alcohol/week: 1.0 standard drinks     Types: 1 Glasses of wine per week    Drug use: No    Sexual activity: Yes     Partners: Male     Birth control/protection: None     Family History   Problem Relation Age of Onset    Alcohol abuse Mother     Arthritis-osteo Father     Elevated Lipids Father     Heart Disease Father     Hypertension Father     Cancer Maternal Aunt        Allergies   Allergen Reactions    Shellfish Derived Hives and Swelling       Objective:  Visit Vitals  /82 (BP 1 Location: Left arm, BP Patient Position: Sitting)   Pulse 71   Temp 98.6 °F (37 °C) (Oral)   Resp 18   Ht 5' 8\" (1.727 m)   Wt 233 lb 3.2 oz (105.8 kg)   SpO2 99%   BMI 35.46 kg/m²       Physical Exam:   Constitutional: General Appearance: healthy-appearing and obese. Level of Distress: NAD. Ambulation: ambulating normally. Overweight for height  Psychiatric: Mental Status: normal mood and affect and active and alert. Orientation: to time, place, and person. no agitation. ,normal eye contact. normal insight  Head: Head: normocephalic and atraumatic. Eyes: Pupils: PERRLA. Sclerae: non-icteric. ENMT: No lesions on external ear, no hearing loss. No lesions on external nose, sinus tenderness, or nasal discharge. Lips, Teeth, and no mouth or lip ulcers   Neck: Neck: supple, trachea midline, and no masses. Lymph Nodes: no cervical LAD. Thyroid: no enlargement or nodules and non-tender. Lungs: Respiratory effort: no dyspnea. Auscultation: no wheezing, rales/crackles, or rhonchi and breath sounds normal and good air movement. Cardiovascular: Apical Impulse: not displaced. Heart Auscultation: normal S1 and S2; no murmurs, rubs, or gallops; and RRR. Neck vessels: no carotid bruits. Pulses including femoral / pedal: normal throughout. Abdomen: Bowel Sounds: normal. Inspection and Palpation: no tenderness, guarding, or masses and soft and non-distended. Liver: non-tender   Musculoskeletal[de-identified] Extremities: no edema or varicosities. Calf tenderness. Neurologic: Gait and Station: normal gait and station. Motor Strength normal right and left. Sensory and cerebellar intact.   Skin: Inspection and palpation: no rash, lesions, or ulcer.- nail toe onycomycosis       Results for orders placed or performed in visit on 01/07/21    MAMMOGRAPHY   Result Value Ref Range    Mammography, External wnl    AMB EXT CREATININE   Result Value Ref Range    Creatinine, External 0.83        Assessment/Plan:    ICD-10-CM ICD-9-CM    1. Essential hypertension  I10 401.9    2. Seasonal allergic rhinitis, unspecified trigger  J30.2 477.9    3. Menorrhagia with regular cycle  N92.0 626.2    4. Onychomycosis  B35.1 110.1      No orders of the defined types were placed in this encounter. call if any problems    There are no Patient Instructions on file for this visit. Follow-up and Dispositions    · Return in about 6 months (around 7/18/2021).

## 2021-02-09 RX ORDER — HYDROCHLOROTHIAZIDE 25 MG/1
25 TABLET ORAL DAILY
Qty: 90 TAB | Refills: 0 | Status: SHIPPED | OUTPATIENT
Start: 2021-02-09 | End: 2021-05-10

## 2021-05-07 ENCOUNTER — TELEPHONE (OUTPATIENT)
Dept: INTERNAL MEDICINE CLINIC | Age: 46
End: 2021-05-07

## 2021-05-07 DIAGNOSIS — I10 ESSENTIAL HYPERTENSION, BENIGN: Primary | ICD-10-CM

## 2021-05-07 RX ORDER — ACETAMINOPHEN 500 MG
1 TABLET ORAL DAILY
Qty: 1 KIT | Refills: 0 | Status: CANCELLED | OUTPATIENT
Start: 2021-05-07

## 2021-05-07 NOTE — TELEPHONE ENCOUNTER
Patient called concerned about her blood pressure. Per patient went to the GYN for her annual wellness and bp was 140/80. I advised patient BP was a little high but not alarming at this time. Advised patient to monitor bp and keep a log for 2 weeks and let us know the range after. Patient okay with these instructions. Will call in 2 weeks.

## 2021-05-24 ENCOUNTER — TELEPHONE (OUTPATIENT)
Dept: INTERNAL MEDICINE CLINIC | Age: 46
End: 2021-05-24

## 2021-05-30 DIAGNOSIS — I10 ESSENTIAL HYPERTENSION: Primary | ICD-10-CM

## 2021-06-18 ENCOUNTER — TELEPHONE (OUTPATIENT)
Dept: INTERNAL MEDICINE CLINIC | Age: 46
End: 2021-06-18

## 2021-06-18 NOTE — TELEPHONE ENCOUNTER
Patient called stating that [address] [place] [address] sent over some paperwork that needed to be signed and faxed back over for her blood pressure kit but has not received any response. I informed patient that Dr. Garret Berg is out of office for the next 4-6 weeks, his nurse is out of office on Fridays and no other provider is available to sign the paperwork so she will have to wait until Monday when his nurse returns. Pt verbalized understanding and stated she can wait until Monday.

## 2021-06-23 RX ORDER — MONTELUKAST SODIUM 10 MG/1
10 TABLET ORAL DAILY
Qty: 90 TABLET | Refills: 0 | Status: SHIPPED | OUTPATIENT
Start: 2021-06-23 | End: 2021-09-22

## 2021-06-24 ENCOUNTER — TELEPHONE (OUTPATIENT)
Dept: INTERNAL MEDICINE CLINIC | Age: 46
End: 2021-06-24

## 2021-06-24 NOTE — TELEPHONE ENCOUNTER
Patient called again to inquire about paperwork being signed for blood pressure cuff kit. She said it has been over a month since this paperwork was supposed to be faxed. She has been unable to get any response from Dr. Michele Starkey or his nurse, she is going to seek a new provider b/c this is ridiculous. Patient stated she will be dead by the time she receives her BP cuff. I apologized and informed patient that Dr. Fariha Nicholas nurse is out of office and to callback on Monday so she can better assist her. I also informed her that I would tamie her message urgent. Pt agreed to callback Monday.

## 2021-06-29 NOTE — TELEPHONE ENCOUNTER
Spoke with patient, ID verified, to inform paperwork not received per Dr. Marisela Grady nurse. She stated that she will call [facility] to re-fax paperwork for provider to sign off on.

## 2021-07-14 ENCOUNTER — OFFICE VISIT (OUTPATIENT)
Dept: FAMILY MEDICINE CLINIC | Age: 46
End: 2021-07-14
Payer: COMMERCIAL

## 2021-07-14 ENCOUNTER — TELEPHONE (OUTPATIENT)
Dept: FAMILY MEDICINE CLINIC | Age: 46
End: 2021-07-14

## 2021-07-14 VITALS
DIASTOLIC BLOOD PRESSURE: 80 MMHG | RESPIRATION RATE: 16 BRPM | OXYGEN SATURATION: 98 % | SYSTOLIC BLOOD PRESSURE: 124 MMHG | HEART RATE: 78 BPM | HEIGHT: 69 IN | TEMPERATURE: 98.1 F | WEIGHT: 239 LBS | BODY MASS INDEX: 35.4 KG/M2

## 2021-07-14 DIAGNOSIS — I10 ESSENTIAL HYPERTENSION: Primary | ICD-10-CM

## 2021-07-14 PROCEDURE — 99213 OFFICE O/P EST LOW 20 MIN: CPT | Performed by: NURSE PRACTITIONER

## 2021-07-14 RX ORDER — BLOOD PRESSURE TEST KIT-MEDIUM
1 KIT MISCELLANEOUS 2 TIMES DAILY
Qty: 1 KIT | Refills: 0 | Status: SHIPPED | OUTPATIENT
Start: 2021-07-14

## 2021-07-14 RX ORDER — HYDROCHLOROTHIAZIDE 25 MG/1
TABLET ORAL
COMMUNITY
End: 2021-07-19 | Stop reason: SDUPTHER

## 2021-07-14 NOTE — PATIENT INSTRUCTIONS
Please follow up as needed. Check blood pressure daily and any numbers above 140/90 you need to be evaluated for. The cuff has been ordered. Please apply warm compress 2-3 daily to skin lesion. Follow up in office if this does not resolve. Home Blood Pressure Test: About This Test  What is it? A home blood pressure test allows you to keep track of your blood pressure at home. Blood pressure is a measure of the force of blood against the walls of your arteries. Blood pressure readings include two numbers, such as 130/80 (say \"130 over 80\"). The first number is the systolic pressure. The second number is the diastolic pressure. Why is this test done? You may do this test at home to:  · Find out if you have high blood pressure. · Track your blood pressure if you have high blood pressure. · Track how well medicine is working to reduce high blood pressure. · Check how lifestyle changes, such as weight loss and exercise, are affecting blood pressure. How do you prepare for the test?  For at least 30 minutes before you take your blood pressure, don't exercise, drink caffeine, or smoke. Empty your bladder before the test. Sit quietly with your back straight and both feet on the floor for at least 5 minutes. This helps you take your blood pressure while you feel comfortable and relaxed. How is the test done? · If your doctor recommends it, take your blood pressure twice a day. Take it in the morning and evening. · Sit with your arm slightly bent and resting on a table so that your upper arm is at the same level as your heart. · Use the same arm each time you take your blood pressure. · Place the blood pressure cuff on the bare skin of your upper arm. You may have to roll up your sleeve, remove your arm from the sleeve, or take your shirt off. · Wrap the blood pressure cuff around your upper arm so that the lower edge of the cuff is about 1 inch above the bend of your elbow.   · Do not move, talk, or text while you take your blood pressure. Follow the instructions that came with your blood pressure monitor. They might be different from the following. · Press the on/off button on the automatic monitor. Then you may need to wait until the screen says the monitor is ready. · Press the start button. The cuff will inflate and deflate by itself. · Your blood pressure numbers will appear on the screen. · Wait one minute and take your blood pressure again. · If your monitor does not automatically save your numbers, write them in your log book, along with the date and time. Follow-up care is a key part of your treatment and safety. Be sure to make and go to all appointments, and call your doctor if you are having problems. It's also a good idea to keep a list of the medicines you take. Where can you learn more? Go to http://www.alonso.com/  Enter C427 in the search box to learn more about \"Home Blood Pressure Test: About This Test.\"  Current as of: August 31, 2020               Content Version: 12.8  © 2006-2021 [employer]. Care instructions adapted under license by [employer] (which disclaims liability or warranty for this information). If you have questions about a medical condition or this instruction, always ask your healthcare professional. [address] any warranty or liability for your use of this information. DASH Diet: Care Instructions  Your Care Instructions     The DASH diet is an eating plan that can help lower your blood pressure. DASH stands for Dietary Approaches to Stop Hypertension. Hypertension is high blood pressure. The DASH diet focuses on eating foods that are high in calcium, potassium, and magnesium. These nutrients can lower blood pressure. The foods that are highest in these nutrients are fruits, vegetables, low-fat dairy products, nuts, seeds, and legumes.  But taking calcium, potassium, and magnesium supplements instead of eating foods that are high in those nutrients does not have the same effect. The DASH diet also includes whole grains, fish, and poultry. The DASH diet is one of several lifestyle changes your doctor may recommend to lower your high blood pressure. Your doctor may also want you to decrease the amount of sodium in your diet. Lowering sodium while following the DASH diet can lower blood pressure even further than just the DASH diet alone. Follow-up care is a key part of your treatment and safety. Be sure to make and go to all appointments, and call your doctor if you are having problems. It's also a good idea to know your test results and keep a list of the medicines you take. How can you care for yourself at home? Following the DASH diet  · Eat 4 to 5 servings of fruit each day. A serving is 1 medium-sized piece of fruit, ½ cup chopped or canned fruit, 1/4 cup dried fruit, or 4 ounces (½ cup) of fruit juice. Choose fruit more often than fruit juice. · Eat 4 to 5 servings of vegetables each day. A serving is 1 cup of lettuce or raw leafy vegetables, ½ cup of chopped or cooked vegetables, or 4 ounces (½ cup) of vegetable juice. Choose vegetables more often than vegetable juice. · Get 2 to 3 servings of low-fat and fat-free dairy each day. A serving is 8 ounces of milk, 1 cup of yogurt, or 1 ½ ounces of cheese. · Eat 6 to 8 servings of grains each day. A serving is 1 slice of bread, 1 ounce of dry cereal, or ½ cup of cooked rice, pasta, or cooked cereal. Try to choose whole-grain products as much as possible. · Limit lean meat, poultry, and fish to 2 servings each day. A serving is 3 ounces, about the size of a deck of cards. · Eat 4 to 5 servings of nuts, seeds, and legumes (cooked dried beans, lentils, and split peas) each week. A serving is 1/3 cup of nuts, 2 tablespoons of seeds, or ½ cup of cooked beans or peas. · Limit fats and oils to 2 to 3 servings each day.  A serving is 1 teaspoon of vegetable oil or 2 tablespoons of salad dressing. · Limit sweets and added sugars to 5 servings or less a week. A serving is 1 tablespoon jelly or jam, ½ cup sorbet, or 1 cup of lemonade. · Eat less than 2,300 milligrams (mg) of sodium a day. If you limit your sodium to 1,500 mg a day, you can lower your blood pressure even more. · Be aware that all of these are the suggested number of servings for people who eat 1,800 to 2,000 calories a day. Your recommended number of servings may be different if you need more or fewer calories. Tips for success  · Start small. Do not try to make dramatic changes to your diet all at once. You might feel that you are missing out on your favorite foods and then be more likely to not follow the plan. Make small changes, and stick with them. Once those changes become habit, add a few more changes. · Try some of the following:  ? Make it a goal to eat a fruit or vegetable at every meal and at snacks. This will make it easy to get the recommended amount of fruits and vegetables each day. ? Try yogurt topped with fruit and nuts for a snack or healthy dessert. ? Add lettuce, tomato, cucumber, and onion to sandwiches. ? Combine a ready-made pizza crust with low-fat mozzarella cheese and lots of vegetable toppings. Try using tomatoes, squash, spinach, broccoli, carrots, cauliflower, and onions. ? Have a variety of cut-up vegetables with a low-fat dip as an appetizer instead of chips and dip. ? Sprinkle sunflower seeds or chopped almonds over salads. Or try adding chopped walnuts or almonds to cooked vegetables. ? Try some vegetarian meals using beans and peas. Add garbanzo or kidney beans to salads. Make burritos and tacos with mashed harrington beans or black beans. Where can you learn more? Go to http://www.alonso.com/  Enter H967 in the search box to learn more about \"DASH Diet: Care Instructions. \"  Current as of: August 31, 2020               Content Version: 12.8  © 2006-2021 [employer]. Care instructions adapted under license by [employer] (which disclaims liability or warranty for this information). If you have questions about a medical condition or this instruction, always ask your healthcare professional. [address] any warranty or liability for your use of this information. High Blood Pressure: Care Instructions  Overview     It's normal for blood pressure to go up and down throughout the day. But if it stays up, you have high blood pressure. Another name for high blood pressure is hypertension. Despite what a lot of people think, high blood pressure usually doesn't cause headaches or make you feel dizzy or lightheaded. It usually has no symptoms. But it does increase your risk of stroke, heart attack, and other problems. You and your doctor will talk about your risks of these problems based on your blood pressure. Your doctor will give you a goal for your blood pressure. Your goal will be based on your health and your age. Lifestyle changes, such as eating healthy and being active, are always important to help lower blood pressure. You might also take medicine to reach your blood pressure goal.  Follow-up care is a key part of your treatment and safety. Be sure to make and go to all appointments, and call your doctor if you are having problems. It's also a good idea to know your test results and keep a list of the medicines you take. How can you care for yourself at home? Medical treatment  · If you stop taking your medicine, your blood pressure will go back up. You may take one or more types of medicine to lower your blood pressure. Be safe with medicines. Take your medicine exactly as prescribed. Call your doctor if you think you are having a problem with your medicine. · Talk to your doctor before you start taking aspirin every day.  Aspirin can help certain people lower their risk of a heart attack or stroke. But taking aspirin isn't right for everyone, because it can cause serious bleeding. · See your doctor regularly. You may need to see the doctor more often at first or until your blood pressure comes down. · If you are taking blood pressure medicine, talk to your doctor before you take decongestants or anti-inflammatory medicine, such as ibuprofen. Some of these medicines can raise blood pressure. · Learn how to check your blood pressure at home. Lifestyle changes  · Stay at a healthy weight. This is especially important if you put on weight around the waist. Losing even 10 pounds can help you lower your blood pressure. · If your doctor recommends it, get more exercise. Walking is a good choice. Bit by bit, increase the amount you walk every day. Try for at least 30 minutes on most days of the week. You also may want to swim, bike, or do other activities. · Avoid or limit alcohol. Talk to your doctor about whether you can drink any alcohol. · Try to limit how much sodium you eat to less than 2,300 milligrams (mg) a day. Your doctor may ask you to try to eat less than 1,500 mg a day. · Eat plenty of fruits (such as bananas and oranges), vegetables, legumes, whole grains, and low-fat dairy products. · Lower the amount of saturated fat in your diet. Saturated fat is found in animal products such as milk, cheese, and meat. Limiting these foods may help you lose weight and also lower your risk for heart disease. · Do not smoke. Smoking increases your risk for heart attack and stroke. If you need help quitting, talk to your doctor about stop-smoking programs and medicines. These can increase your chances of quitting for good. When should you call for help? Call  911 anytime you think you may need emergency care. This may mean having symptoms that suggest that your blood pressure is causing a serious heart or blood vessel problem.  Your blood pressure may be over 180/120. For example, call 911 if:    · You have symptoms of a heart attack. These may include:  ? Chest pain or pressure, or a strange feeling in the chest.  ? Sweating. ? Shortness of breath. ? Nausea or vomiting. ? Pain, pressure, or a strange feeling in the back, neck, jaw, or upper belly or in one or both shoulders or arms. ? Lightheadedness or sudden weakness. ? A fast or irregular heartbeat.     · You have symptoms of a stroke. These may include:  ? Sudden numbness, tingling, weakness, or loss of movement in your face, arm, or leg, especially on only one side of your body. ? Sudden vision changes. ? Sudden trouble speaking. ? Sudden confusion or trouble understanding simple statements. ? Sudden problems with walking or balance. ? A sudden, severe headache that is different from past headaches.     · You have severe back or belly pain. Do not wait until your blood pressure comes down on its own. Get help right away. Call your doctor now or seek immediate care if:    · Your blood pressure is much higher than normal (such as 180/120 or higher), but you don't have symptoms.     · You think high blood pressure is causing symptoms, such as:  ? Severe headache.  ? Blurry vision. Watch closely for changes in your health, and be sure to contact your doctor if:    · Your blood pressure measures higher than your doctor recommends at least 2 times. That means the top number is higher or the bottom number is higher, or both.     · You think you may be having side effects from your blood pressure medicine. Where can you learn more? Go to http://www.gray.com/  Enter [id number] in the search box to learn more about \"High Blood Pressure: Care Instructions. \"  Current as of: August 31, 2020               Content Version: 12.8  © 2965-4835 [employer].    Care instructions adapted under license by [employer] (which disclaims liability or warranty for this information). If you have questions about a medical condition or this instruction, always ask your healthcare professional. [place] [address] any warranty or liability for your use of this information.

## 2021-07-14 NOTE — PROGRESS NOTES
Hiram Sherman is a 39 y.o. female who presents to clinic today for the following:    Chief Complaint   Patient presents with    New Patient    Blood Pressure Check     Patient states unable to get previous office to send bp monitors. Patient needs arm measure to determine size. Right arm 13in and Left arm 13.5in    Mass     Patient reports ingrown hair left armpit. Vitals:    21 1455   BP: 124/80   Pulse: 78   Resp: 16   Temp: 98.1 °F (36.7 °C)   TempSrc: Skin   SpO2: 98%   Weight: 239 lb (108.4 kg)   Height: 5' 8.5\" (1.74 m)       Body mass index is 35.81 kg/m². Patients past medical, surgical and family histories were reviewed. Allergies and Medications reviewed and updated. Current Outpatient Medications:     hydroCHLOROthiazide (HYDRODIURIL) 25 mg tablet, hydrochlorothiazide 25 mg tablet  Take 1 tablet every day by oral route., Disp: , Rfl:     Blood Pressure Kit-Extra Large kit, 1 Device by Does Not Apply route two (2) times a day., Disp: 1 Kit, Rfl: 0    montelukast (SINGULAIR) 10 mg tablet, Take 1 Tablet by mouth daily for 90 days. , Disp: 90 Tablet, Rfl: 0    potassium chloride SR (KLOR-CON 10) 10 mEq tablet, Take 1 Tab by mouth daily for 180 days. , Disp: 90 Tab, Rfl: 1    miscellaneous medical supply (Blood Pressure Cuff) misc, 1 Device by Does Not Apply route daily.  (Patient not taking: Reported on 2021), Disp: 1 Each, Rfl: 0    Allergies   Allergen Reactions    Shellfish Derived Hives and Swelling       Past Medical History:   Diagnosis Date    Abnormal Pap smear      repeat WNL    Allergies     Anemia     Essential hypertension     chronic hypertension    Sickle cell trait (HCC)     Sickle-cell disease, unspecified     pt and  has trait, fetus does not per pt       Past Surgical History:   Procedure Laterality Date    HX HERNIA REPAIR          PA  DELIVERY ONLY      - fetal distress       Family History   Problem Relation Age of Onset    Alcohol abuse Mother     Arthritis-osteo Father     Elevated Lipids Father     Heart Disease Father     Hypertension Father     Cancer Maternal Aunt        Social History     Socioeconomic History    Marital status:      Spouse name: Not on file    Number of children: Not on file    Years of education: Not on file    Highest education level: Not on file   Occupational History    Not on file   Tobacco Use    Smoking status: Never Smoker    Smokeless tobacco: Never Used   Substance and Sexual Activity    Alcohol use: Yes     Alcohol/week: 1.0 standard drinks     Types: 1 Glasses of wine per week    Drug use: No    Sexual activity: Yes     Partners: Male     Birth control/protection: None   Other Topics Concern    Not on file   Social History Narrative    Not on file     Social Determinants of Health     Financial Resource Strain:     Difficulty of Paying Living Expenses:    Food Insecurity:     Worried About Running Out of Food in the Last Year:     [address] in the Last Year:    Transportation Needs:     Lack of Transportation (Medical):  Lack of Transportation (Non-Medical):    Physical Activity:     Days of Exercise per Week:     Minutes of Exercise per Session:    Stress:     Feeling of Stress :    Social Connections:     Frequency of Communication with Friends and Family:     Frequency of Social Gatherings with Friends and Family:     Attends [employer] Services:     Active Member of Clubs or Organizations:     Attends Club or Organization Meetings:     Marital Status:    Intimate Partner Violence:     Fear of Current or Ex-Partner:     Emotionally Abused:     Physically Abused:     Sexually Abused:            Physical Exam  Vitals and nursing note reviewed. Constitutional:       Appearance: She is obese. HENT:      Head: Normocephalic.       Nose: Nose normal.      Mouth/Throat:      Mouth: Mucous membranes are moist.   Eyes:      Pupils: Pupils are equal, round, and reactive to light. Cardiovascular:      Rate and Rhythm: Normal rate and regular rhythm. Pulses: Normal pulses. Heart sounds: Normal heart sounds. Pulmonary:      Effort: Pulmonary effort is normal.   Abdominal:      General: Abdomen is flat. Musculoskeletal:         General: Normal range of motion. Cervical back: Normal range of motion. Skin:     General: Skin is warm. Capillary Refill: Capillary refill takes less than 2 seconds. Neurological:      General: No focal deficit present. Mental Status: She is alert and oriented to person, place, and time. Psychiatric:         Mood and Affect: Mood normal.         Behavior: Behavior normal.          Patient was seen in office for evaluation of hypertension. Blood pressure is controlled today. Patient was in need of blood pressure cuff. This had previously been ordered by another provider but she has been unable to get back in touch with that provider and information has not been sent in. Today this was ordered so the patient can check her blood pressure at home and we can establish whether medication is efficient or not. New order was written and has been faxed to the appropriate company. The patient was also complaining of an ingrown hair in her lower groin area. She stated she is 1 small area. I advised her to use hot compresses multiple times a day. I advised her if it does not resolve that she would need to be seen in office for evaluation. Review of Systems   Constitutional: Negative for fever and malaise/fatigue. HENT: Negative for congestion, sinus pain and sore throat. Eyes: Negative. Respiratory: Negative for cough and shortness of breath. Cardiovascular: Negative for chest pain. Gastrointestinal: Negative for diarrhea, nausea and vomiting. Genitourinary: Negative for dysuria. Musculoskeletal: Negative. Skin: Negative.     Neurological: Negative for dizziness and headaches. Endo/Heme/Allergies: Negative for environmental allergies. Psychiatric/Behavioral: Negative. No results found. No results found for this or any previous visit (from the past 24 hour(s)). Assessment and Plan:    Encounter Diagnoses   Name Primary?  Essential hypertension Yes                I have discussed the diagnosis with the patient and the intended plan as seen in the above orders. she has expressed understanding. The patient has received an after-visit summary and questions were answered concerning future plans. I have discussed medication side effects and warnings with the patient as well.         Electronically Signed: Cyrus Cordero NP

## 2021-07-14 NOTE — PROGRESS NOTES
1. Have you been to the ER, urgent care clinic since your last visit? Hospitalized since your last visit? No    2. Have you seen or consulted any other health care providers outside of the [address] [place] [address] since your last visit? Include any pap smears or colon screening. No     Chief Complaint   Patient presents with    New Patient    Blood Pressure Check     Patient states unable to get previous office to send bp monitors. Patient needs arm measure to determine size. Right arm 13in and Left arm 13.5in    Mass     Patient reports ingrown hair left armpit. 3 most recent PHQ Screens 7/14/2021   Little interest or pleasure in doing things Not at all   Feeling down, depressed, irritable, or hopeless Not at all   Total Score PHQ 2 0     Visit Vitals  /80 (BP 1 Location: Left arm, BP Patient Position: Sitting, BP Cuff Size: Adult)   Pulse 78   Temp 98.1 °F (36.7 °C) (Skin)   Resp 16   Ht 5' 8.5\" (1.74 m)   Wt 239 lb (108.4 kg)   SpO2 98%   BMI 35.81 kg/m²     Abuse Screening Questionnaire 7/14/2021   Do you ever feel afraid of your partner? N   Are you in a relationship with someone who physically or mentally threatens you? N   Is it safe for you to go home?  Y     Health Maintenance Due   Topic Date Due    Hepatitis C Screening  Never done    COVID-19 Vaccine (1) Never done    DTaP/Tdap/Td series (1 - Tdap) Never done    Lipid Screen  Never done    PAP AKA CERVICAL CYTOLOGY  01/10/2021     Learning Assessment 7/14/2021   PRIMARY LEARNER Patient   HIGHEST LEVEL OF EDUCATION - PRIMARY LEARNER  4 YEARS OF COLLEGE   BARRIERS PRIMARY LEARNER NONE   PRIMARY LANGUAGE ENGLISH   LEARNER PREFERENCE PRIMARY VIDEOS   ANSWERED BY patient   RELATIONSHIP SELF

## 2021-07-19 ENCOUNTER — TELEPHONE (OUTPATIENT)
Dept: FAMILY MEDICINE CLINIC | Age: 46
End: 2021-07-19

## 2021-07-19 ENCOUNTER — VIRTUAL VISIT (OUTPATIENT)
Dept: FAMILY MEDICINE CLINIC | Age: 46
End: 2021-07-19
Payer: COMMERCIAL

## 2021-07-19 DIAGNOSIS — I10 ESSENTIAL HYPERTENSION: Primary | ICD-10-CM

## 2021-07-19 DIAGNOSIS — E66.9 OBESITY (BMI 35.0-39.9 WITHOUT COMORBIDITY): ICD-10-CM

## 2021-07-19 DIAGNOSIS — Z13.1 SCREENING FOR DIABETES MELLITUS: ICD-10-CM

## 2021-07-19 DIAGNOSIS — Z13.220 SCREENING FOR HYPERLIPIDEMIA: ICD-10-CM

## 2021-07-19 PROCEDURE — 99214 OFFICE O/P EST MOD 30 MIN: CPT | Performed by: FAMILY MEDICINE

## 2021-07-19 RX ORDER — HYDROCHLOROTHIAZIDE 25 MG/1
TABLET ORAL
Qty: 90 TABLET | Refills: 0 | Status: SHIPPED | OUTPATIENT
Start: 2021-07-19 | End: 2021-09-24

## 2021-07-19 NOTE — TELEPHONE ENCOUNTER
----- Message from [employer] sent at 7/19/2021 12:16 PM EDT -----  Regarding: Ryann Thomas, NP/refill  Medication Refill    Caller (if not patient): pt      Relationship of caller (if not patient): pt      Best contact number(s): [phone]      Name of medication and dosage if known: hydrochlorothiazide      Is patient out of this medication (yes/no): no      Pharmacy name: [facility]  Pharmacy listed in chart? (yes/no): yes  Pharmacy phone number: [phone]       Details to clarify the request: Pt also requesting update on orders for blood pressure cuff, should have been sent to [address] [place] [address], requested last week.        [employer]

## 2021-07-19 NOTE — PROGRESS NOTES
1. Have you been to the ER, urgent care clinic since your last visit? Hospitalized since your last visit? No    2. Have you seen or consulted any other health care providers outside of the [address] [place] [address] since your last visit? Include any pap smears or colon screening. No     Patient seen by Kellen Choi NP on 7/14/2021  Chief Complaint   Patient presents with    Medication Refill    Hypertension     Awaiting Kellen Choi NP to sign off blood pressure cuff monitor. Form recieved from [facility] and placed on Kellen Choi NP desk. Patient-Reported Vitals 7/19/2021   Patient-Reported Weight 239lb     Health Maintenance Due   Topic Date Due    Hepatitis C Screening  Never done    COVID-19 Vaccine (1) Never done    DTaP/Tdap/Td series (1 - Tdap) Never done    Lipid Screen  Never done    PAP AKA CERVICAL CYTOLOGY  01/10/2021     3 most recent PHQ Screens 7/19/2021   Little interest or pleasure in doing things Not at all   Feeling down, depressed, irritable, or hopeless Not at all   Total Score PHQ 2 0     Abuse Screening Questionnaire 7/19/2021   Do you ever feel afraid of your partner? N   Are you in a relationship with someone who physically or mentally threatens you? N   Is it safe for you to go home?  Y     Health Maintenance Due   Topic Date Due    Hepatitis C Screening  Never done    COVID-19 Vaccine (1) Never done    DTaP/Tdap/Td series (1 - Tdap) Never done    Lipid Screen  Never done    PAP AKA CERVICAL CYTOLOGY  01/10/2021     Learning Assessment 7/14/2021   PRIMARY LEARNER Patient   HIGHEST LEVEL OF EDUCATION - PRIMARY LEARNER  4 YEARS OF COLLEGE   BARRIERS PRIMARY LEARNER NONE   PRIMARY LANGUAGE ENGLISH   LEARNER PREFERENCE PRIMARY VIDEOS   ANSWERED BY patient   RELATIONSHIP SELF

## 2021-07-19 NOTE — PROGRESS NOTES
Alfreda Agosto is a 39 y.o. female who was seen by synchronous (real-time) audio-video technology on 7/19/2021 for Medication Refill, Hypertension (Joaquin Don NP to sign off blood pressure cuff monitor. Form recieved from [facility] and placed on Oliverio Baker NP desk. ), New Patient, and Establish Care  here to establish care   no recent blood work in our system   taking HCTZ and potassium  Needs refill on the HCTZ    She is interested in weight   Management      Assessment & Plan:   Diagnoses and all orders for this visit:    1. Essential hypertension  -     hydroCHLOROthiazide (HYDRODIURIL) 25 mg tablet; hydrochlorothiazide 25 mg tablet  Take 1 tablet every day by oral route. -     METABOLIC PANEL, COMPREHENSIVE; Future  Check labs and address as indicated  nochanges in meds at this time. The patient reports her bp is usually well controlled on this medication  A few days ago was normal.   2. Screening for diabetes mellitus  -     HEMOGLOBIN A1C WITH EAG; Future    3. Obesity (BMI 35.0-39.9 without comorbidity)  Given number to call for weight management orientation    4. Screening for hyperlipidemia  -     LIPID PANEL; Future            Subjective:       Prior to Admission medications    Medication Sig Start Date End Date Taking? Authorizing Provider   hydroCHLOROthiazide (HYDRODIURIL) 25 mg tablet hydrochlorothiazide 25 mg tablet  Take 1 tablet every day by oral route. 7/19/21  Yes Jasmina Canales MD   montelukast (SINGULAIR) 10 mg tablet Take 1 Tablet by mouth daily for 90 days. 6/23/21 9/21/21 Yes Mandi [place] MD   potassium chloride SR (KLOR-CON 10) 10 mEq tablet Take 1 Tab by mouth daily for 180 days. 2/4/21 8/3/21 Yes Mandi [place] MD   Blood Pressure Kit-Extra Large kit 1 Device by Does Not Apply route two (2) times a day.   Patient not taking: Reported on 7/19/2021 7/14/21   Will TAQUERIA Irwin   hydroCHLOROthiazide (HYDRODIURIL) 25 mg tablet hydrochlorothiazide 25 mg tablet   Take 1 tablet every day by oral route. 7/19/21  Provider, Historical   miscellaneous medical supply (Blood Pressure Cuff) misc 1 Device by Does Not Apply route daily. Patient not taking: Reported on 7/14/2021 5/30/21   Darlynn Shone, MD     Patient Active Problem List    Diagnosis Date Noted    Hypertensive disorder 01/07/2021     Current Outpatient Medications   Medication Sig Dispense Refill    hydroCHLOROthiazide (HYDRODIURIL) 25 mg tablet hydrochlorothiazide 25 mg tablet  Take 1 tablet every day by oral route. 90 Tablet 0    montelukast (SINGULAIR) 10 mg tablet Take 1 Tablet by mouth daily for 90 days. 90 Tablet 0    potassium chloride SR (KLOR-CON 10) 10 mEq tablet Take 1 Tab by mouth daily for 180 days. 90 Tab 1    Blood Pressure Kit-Extra Large kit 1 Device by Does Not Apply route two (2) times a day. (Patient not taking: Reported on 7/19/2021) 1 Kit 0    miscellaneous medical supply (Blood Pressure Cuff) misc 1 Device by Does Not Apply route daily.  (Patient not taking: Reported on 7/14/2021) 1 Each 0       ROS    Objective:     Patient-Reported Vitals 7/19/2021   Patient-Reported Weight 239lb        [INSTRUCTIONS:  \"[x]\" Indicates a positive item  \"[]\" Indicates a negative item  -- DELETE ALL ITEMS NOT EXAMINED]    Constitutional: [x] Appears well-developed and well-nourished [x] No apparent distress      [] Abnormal -     Mental status: [x] Alert and awake  [x] Oriented to person/place/time [x] Able to follow commands    [] Abnormal -     Eyes:   EOM    [x]  Normal    [] Abnormal -   Sclera  [x]  Normal    [] Abnormal -          Discharge [x]  None visible   [] Abnormal -     HENT: [x] Normocephalic, atraumatic  [] Abnormal -   [x] Mouth/Throat: Mucous membranes are moist    External Ears [x] Normal  [] Abnormal -    Neck: [x] No visualized mass [] Abnormal -     Pulmonary/Chest: [x] Respiratory effort normal   [x] No visualized signs of difficulty breathing or respiratory distress        [] Abnormal - Musculoskeletal:   [x] Normal gait with no signs of ataxia         [x] Normal range of motion of neck        [] Abnormal -     Neurological:        [x] No Facial Asymmetry (Cranial nerve 7 motor function) (limited exam due to video visit)          [x] No gaze palsy        [] Abnormal -          Skin:        [x] No significant exanthematous lesions or discoloration noted on facial skin         [] Abnormal -            Psychiatric:       [x] Normal Affect [] Abnormal -        [x] No Hallucinations    Other pertinent observable physical exam findings:-        We discussed the expected course, resolution and complications of the diagnosis(es) in detail. Medication risks, benefits, costs, interactions, and alternatives were discussed as indicated. I advised her to contact the office if her condition worsens, changes or fails to improve as anticipated. She expressed understanding with the diagnosis(es) and plan. Alfreda Agosto, was evaluated through a synchronous (real-time) audio-video encounter. The patient (or guardian if applicable) is aware that this is a billable service. Verbal consent to proceed has been obtained within the past 12 months. The visit was conducted pursuant to the emergency declaration under the [facility], [address] [place] [address] authority and the [employer] General Act. Patient identification was verified, and a caregiver was present when appropriate. The patient was located in a state where the provider was credentialed to provide care.       Rocio Slaughter MD

## 2021-07-22 ENCOUNTER — TELEPHONE (OUTPATIENT)
Dept: FAMILY MEDICINE CLINIC | Age: 46
End: 2021-07-22

## 2021-07-22 NOTE — TELEPHONE ENCOUNTER
----- Message from Michelle Taveras sent at 7/22/2021  9:26 AM EDT -----  Regarding: Dr. Sonny Marion: [phone]  General Message/Vendor Calls    Caller's first and last name: Keiry Cruz with [facility]      Reason for call: Requesting confirmation that Certificate of Medical Necessity was received on 7/16/21.       Callback required yes/no and why: yes/follow up       Best contact number(s): [phone]       Details to clarify the request: N/A      Michelle Taveras

## 2021-07-22 NOTE — TELEPHONE ENCOUNTER
Left message on vm to call office back. LM medication sent to pharmacy and Blood pressure cuff order sent to [facility] via fax.   Fax#: [phone]  File#: [id number]

## 2021-07-23 ENCOUNTER — TELEPHONE (OUTPATIENT)
Dept: FAMILY MEDICINE CLINIC | Age: 46
End: 2021-07-23

## 2021-07-23 NOTE — TELEPHONE ENCOUNTER
Spoke with [facility] to confirm Certificate of Medical Necessity received via fax. Representative states has not received form and will call office back to confirm. Left message on vm to call office back. LM with patient waiting on detail about Certificate of Medical Necessity. 1st attempt.

## 2021-07-23 NOTE — TELEPHONE ENCOUNTER
Certificate of Medical Necessity sent via fax. Reviewed and Signed by Cecy July, NP  Third attempt.     Fax#: [phone]  File#: [id number]

## 2021-07-23 NOTE — TELEPHONE ENCOUNTER
----- Message from Dorothy Ross sent at 7/23/2021 10:56 AM EDT -----  Regarding: TAQUERIA Sutton/Telephone  Contact: [phone]  Patient return call    Caller's first and last name and relationship (if not the patient): Pt. Best contact number(s): [phone]      Whose call is being returned: Nurse. Details to clarify the request: Missed call, needs order refaxed. Fax: [phone] Attention to [facility].        Dorothy Ross

## 2021-07-26 ENCOUNTER — DOCUMENTATION ONLY (OUTPATIENT)
Dept: FAMILY MEDICINE CLINIC | Age: 46
End: 2021-07-26

## 2021-07-28 NOTE — PROGRESS NOTES
[facility] Forms completed and faxed by 07/26/2021 to   (f): [phone]    Confirmation: OK

## 2021-08-23 RX ORDER — POTASSIUM CHLORIDE 750 MG/1
TABLET, FILM COATED, EXTENDED RELEASE ORAL
Qty: 90 TABLET | Refills: 1 | Status: SHIPPED | OUTPATIENT
Start: 2021-08-23 | End: 2022-03-09 | Stop reason: SDUPTHER

## 2021-09-08 ENCOUNTER — TELEPHONE (OUTPATIENT)
Dept: INTERNAL MEDICINE CLINIC | Age: 46
End: 2021-09-08

## 2021-10-18 ENCOUNTER — TELEPHONE (OUTPATIENT)
Dept: INTERNAL MEDICINE CLINIC | Age: 46
End: 2021-10-18

## 2021-10-18 RX ORDER — MONTELUKAST SODIUM 10 MG/1
TABLET ORAL
Qty: 30 TABLET | Refills: 0 | Status: SHIPPED | OUTPATIENT
Start: 2021-10-18 | End: 2021-11-16

## 2021-10-18 NOTE — TELEPHONE ENCOUNTER
Patient is requesting a lab slip to have labs done. She states that she is only seeing Dr. Yessica Tubbs for weight loss. She says that she would like to continue to see you.

## 2021-10-21 ENCOUNTER — OFFICE VISIT (OUTPATIENT)
Dept: INTERNAL MEDICINE CLINIC | Age: 46
End: 2021-10-21
Payer: COMMERCIAL

## 2021-10-21 VITALS
HEART RATE: 80 BPM | SYSTOLIC BLOOD PRESSURE: 112 MMHG | HEIGHT: 69 IN | DIASTOLIC BLOOD PRESSURE: 80 MMHG | WEIGHT: 233 LBS | RESPIRATION RATE: 16 BRPM | BODY MASS INDEX: 34.51 KG/M2 | TEMPERATURE: 98.2 F | OXYGEN SATURATION: 98 %

## 2021-10-21 DIAGNOSIS — E66.3 OVERWEIGHT: ICD-10-CM

## 2021-10-21 DIAGNOSIS — I10 ESSENTIAL HYPERTENSION: Primary | ICD-10-CM

## 2021-10-21 PROCEDURE — 99214 OFFICE O/P EST MOD 30 MIN: CPT | Performed by: INTERNAL MEDICINE

## 2021-10-21 NOTE — PROGRESS NOTES
Chief Complaint   Patient presents with    Follow Up Chronic Condition           Visit Vitals  /80 (BP 1 Location: Left upper arm, BP Patient Position: Sitting)   Pulse 80   Temp 98.2 °F (36.8 °C)   Resp 16   Ht 5' 8.5\" (1.74 m)   Wt 233 lb (105.7 kg)   SpO2 98%   BMI 34.91 kg/m²             1. Have you been to the ER, urgent care clinic since your last visit? Hospitalized since your last visit? No    2. Have you seen or consulted any other health care providers outside of the [address] [place] [address] since your last visit? Include any pap smears or colon screening.  No

## 2021-10-22 LAB
ALBUMIN SERPL-MCNC: 3.9 G/DL (ref 3.8–4.8)
ALBUMIN/GLOB SERPL: 1.3 {RATIO} (ref 1.2–2.2)
ALP SERPL-CCNC: 93 IU/L (ref 44–121)
ALT SERPL-CCNC: 25 IU/L (ref 0–32)
AST SERPL-CCNC: 21 IU/L (ref 0–40)
BASOPHILS # BLD AUTO: 0 X10E3/UL (ref 0–0.2)
BASOPHILS NFR BLD AUTO: 0 %
BILIRUB SERPL-MCNC: <0.2 MG/DL (ref 0–1.2)
BUN SERPL-MCNC: 10 MG/DL (ref 6–24)
BUN/CREAT SERPL: 12 (ref 9–23)
CALCIUM SERPL-MCNC: 9.4 MG/DL (ref 8.7–10.2)
CHLORIDE SERPL-SCNC: 101 MMOL/L (ref 96–106)
CHOLEST SERPL-MCNC: 187 MG/DL (ref 100–199)
CO2 SERPL-SCNC: 26 MMOL/L (ref 20–29)
CREAT SERPL-MCNC: 0.81 MG/DL (ref 0.57–1)
EOSINOPHIL # BLD AUTO: 0.3 X10E3/UL (ref 0–0.4)
EOSINOPHIL NFR BLD AUTO: 3 %
ERYTHROCYTE [DISTWIDTH] IN BLOOD BY AUTOMATED COUNT: 12.1 % (ref 11.7–15.4)
GLOBULIN SER CALC-MCNC: 2.9 G/DL (ref 1.5–4.5)
GLUCOSE SERPL-MCNC: 105 MG/DL (ref 65–99)
HCT VFR BLD AUTO: 34 % (ref 34–46.6)
HDLC SERPL-MCNC: 65 MG/DL
HGB BLD-MCNC: 11.5 G/DL (ref 11.1–15.9)
IMM GRANULOCYTES # BLD AUTO: 0 X10E3/UL (ref 0–0.1)
IMM GRANULOCYTES NFR BLD AUTO: 0 %
LDLC SERPL CALC-MCNC: 105 MG/DL (ref 0–99)
LYMPHOCYTES # BLD AUTO: 2.9 X10E3/UL (ref 0.7–3.1)
LYMPHOCYTES NFR BLD AUTO: 24 %
MCH RBC QN AUTO: 31.2 PG (ref 26.6–33)
MCHC RBC AUTO-ENTMCNC: 33.8 G/DL (ref 31.5–35.7)
MCV RBC AUTO: 92 FL (ref 79–97)
MONOCYTES # BLD AUTO: 0.8 X10E3/UL (ref 0.1–0.9)
MONOCYTES NFR BLD AUTO: 7 %
NEUTROPHILS # BLD AUTO: 7.7 X10E3/UL (ref 1.4–7)
NEUTROPHILS NFR BLD AUTO: 66 %
PLATELET # BLD AUTO: 368 X10E3/UL (ref 150–450)
POTASSIUM SERPL-SCNC: 3.5 MMOL/L (ref 3.5–5.2)
PROT SERPL-MCNC: 6.8 G/DL (ref 6–8.5)
RBC # BLD AUTO: 3.69 X10E6/UL (ref 3.77–5.28)
SODIUM SERPL-SCNC: 141 MMOL/L (ref 134–144)
T4 FREE SERPL-MCNC: 1.18 NG/DL (ref 0.82–1.77)
TRIGL SERPL-MCNC: 95 MG/DL (ref 0–149)
TSH SERPL DL<=0.005 MIU/L-ACNC: 1.16 UIU/ML (ref 0.45–4.5)
VLDLC SERPL CALC-MCNC: 17 MG/DL (ref 5–40)
WBC # BLD AUTO: 11.8 X10E3/UL (ref 3.4–10.8)

## 2021-10-22 NOTE — PROGRESS NOTES
Raul Ta is a 39 y.o. female and presents with Follow Up Chronic Condition  Patient presents for follow-up has been seen by Dr. Lamar Medrano for weight loss but wishes not to follow-up with her she is primary care as well [facility] medicine. We did spend some considerable time discussing diet and exercise low-fat low-cholesterol diet anorectic medication risks and benefits which she wishes to wait at this time she will earnestly try to watch her diet and eat well and will let me know how she does she was agreeable for the office phlebotomist to draw blood flow studies below she has had mammography June of this year with biopsy for an abnormality seen on mammography of the breast but all was normal she continues to be seen by OB/GYN blood pressure 120/80 left arm weight 233 pounds about the same as last visit January of this year patient wishes no code vaccination she needed medications refilled we discussed preventive care issues overall doing well           Review of Systems  Constitutional: negative for fevers, chills, anorexia, weight loss and fatigue,no insomnia overweight for height  Eyes:   negative for visual disturbance and irritation, eye discharge, eye pain. no eye redness. ENT:   negative for tinnitus, sore throat, nasal congestion, ear pain, hoarseness, hearing loss.,no snoring. Respiratory:  negative for cough, hemoptysis, shortness of breath, wheezing,  CV:   negative for chest pain, palpitations, lower extremity edema, shortness of breath while sitting, walking or at night  GI:   negative for nausea, vomiting, diarrhea, abdominal pain,melena,constipation. Endo:               negative for polyuria, polydipsia, polyphagia, cold or heat intolerance,hair loss. Genitourinary: negative for frequency, dysuria and hematuria,urethral discharge,nocturia.straining while urination,urinary incontinence.   Integument:  negative for rash and pruritus  Hematologic:  negative for easy bruising and gum/nose bleeding, enlarged nodes  Musculoskel: negative for myalgias, arthralgias, back pain, muscle weakness, joint pain, h/o fall,cramps,calf pain. Neurological:  negative for headaches, dizziness, vertigo, memory problems, gait and seizures loss of consciousness,no ataxia. Behavl/Psych: negative for feelings of anxiety, depression, mood changes ,sadness    Past Medical History:   Diagnosis Date    Abnormal Pap smear     2010 repeat WNL    Allergies     Anemia     Essential hypertension     chronic hypertension    Sickle cell trait (HCC)     Sickle-cell disease, unspecified     pt and  has trait, fetus does not per pt     Past Surgical History:   Procedure Laterality Date    HX HERNIA REPAIR          VT  DELIVERY ONLY      - fetal distress     Social History     Socioeconomic History    Marital status: UNKNOWN     Spouse name: Not on file    Number of children: Not on file    Years of education: Not on file    Highest education level: Not on file   Tobacco Use    Smoking status: Never Smoker    Smokeless tobacco: Never Used   Vaping Use    Vaping Use: Never used   Substance and Sexual Activity    Alcohol use: Yes     Alcohol/week: 1.0 standard drinks     Types: 1 Glasses of wine per week    Drug use: No    Sexual activity: Yes     Partners: Male     Birth control/protection: None     Social Determinants of Health     Financial Resource Strain:     Difficulty of Paying Living Expenses:    Food Insecurity:     Worried About Running Out of Food in the Last Year:     [address] in the Last Year:    Transportation Needs:     Lack of Transportation (Medical):      Lack of Transportation (Non-Medical):    Physical Activity:     Days of Exercise per Week:     Minutes of Exercise per Session:    Stress:     Feeling of Stress :    Social Connections:     Frequency of Communication with Friends and Family:     Frequency of Social Gatherings with Friends and Family:  Attends Rastafarian Services:     Active Member of Clubs or Organizations:     Attends Club or Organization Meetings:     Marital Status:      Family History   Problem Relation Age of Onset    Alcohol abuse Mother     Arthritis-osteo Father     Elevated Lipids Father     Heart Disease Father     Hypertension Father     Cancer Maternal Aunt      Current Outpatient Medications   Medication Sig Dispense Refill    montelukast (SINGULAIR) 10 mg tablet TAKE 1 TABLET BY MOUTH EVERY DAY 30 Tablet 0    hydroCHLOROthiazide (HYDRODIURIL) 25 mg tablet TAKE 1 TABLET BY MOUTH EVERY DAY 90 Tablet 1    potassium chloride SR (KLOR-CON 10) 10 mEq tablet TAKE 1 TABLET BY MOUTH EVERY DAY 90 Tablet 1    Blood Pressure Kit-Extra Large kit 1 Device by Does Not Apply route two (2) times a day. (Patient not taking: Reported on 7/19/2021) 1 Kit 0    miscellaneous medical supply (Blood Pressure Cuff) misc 1 Device by Does Not Apply route daily. (Patient not taking: Reported on 7/14/2021) 1 Each 0     Allergies   Allergen Reactions    Shellfish Derived Hives and Swelling       Objective:  Visit Vitals  /80 (BP 1 Location: Left upper arm, BP Patient Position: Sitting)   Pulse 80   Temp 98.2 °F (36.8 °C)   Resp 16   Ht 5' 8.5\" (1.74 m)   Wt 233 lb (105.7 kg)   SpO2 98%   BMI 34.91 kg/m²       Physical Exam:   Constitutional: General Appearance: healthy-appearing and obese. Level of Distress: NAD. Ambulation: ambulating normally. Overweight psychiatric: Mental Status: normal mood and affect and active and alert. Orientation: to time, place, and person. no agitation. ,normal eye contact. normal insight  Head: Head: normocephalic and atraumatic. Eyes: Pupils: PERRLA. Sclerae: non-icteric. ENMT: No lesions on external ear, no hearing loss. No lesions on external nose, sinus tenderness, or nasal discharge. Lips, Teeth, and no mouth or lip ulcers   Neck: Neck: supple, trachea midline, and no masses.  Lymph Nodes: no cervical LAD. Thyroid: no enlargement or nodules and non-tender. Lungs: Respiratory effort: no dyspnea. Auscultation: no wheezing, rales/crackles, or rhonchi and breath sounds normal and good air movement  Cardiovascular: Apical Impulse: not displaced. Heart Auscultation: normal S1 and S2; no murmurs, rubs, or gallops; and RRR. Neck vessels: no carotid bruits. Pulses including femoral / pedal: normal throughout. Abdomen: Bowel Sounds: normal. Inspection and Palpation: no tenderness, guarding, or masses and soft and non-distended. Liver: non-tender and no hepatomegaly. Musculoskeletal[de-identified] Extremities: no edema or varicosities. Calf tenderness. Neurologic: Gait and Station: normal gait and station. Motor Strength normal right and left. Sensory and cerebellar intact. Skin: Inspection and palpation: no rash, lesions, or ulcer. Results for orders placed or performed in visit on 01/07/21    MAMMOGRAPHY   Result Value Ref Range    Mammography, External wnl    AMB EXT CREATININE   Result Value Ref Range    Creatinine, External 0.83        Assessment/Plan:    ICD-10-CM ICD-9-CM    1. Essential hypertension  I10 401.9 TSH 3RD GENERATION      T4, FREE      CBC WITH AUTOMATED DIFF      METABOLIC PANEL, COMPREHENSIVE      LIPID PANEL   2. Overweight  E66.3 278.02      Orders Placed This Encounter    TSH 3RD GENERATION    T4, FREE    CBC WITH AUTOMATED DIFF    METABOLIC PANEL, COMPREHENSIVE    LIPID PANEL       lose weight, increase physical activity, continue present plan, routine labs ordered, call if any problems    There are no Patient Instructions on file for this visit. Follow-up and Dispositions    · Return in about 6 months (around 4/21/2022).

## 2022-01-25 ENCOUNTER — TELEPHONE (OUTPATIENT)
Dept: INTERNAL MEDICINE CLINIC | Age: 47
End: 2022-01-25

## 2022-01-25 NOTE — TELEPHONE ENCOUNTER
Patient is call to request for some Prednisone and an antibiotic because of her sinus issues.  [facility] 01-25-22

## 2022-01-27 NOTE — TELEPHONE ENCOUNTER
Pt called again asking for a refill on Predisone and a antibiotic for sinus issues.  [facility] [address]

## 2022-02-21 ENCOUNTER — TELEPHONE (OUTPATIENT)
Dept: INTERNAL MEDICINE CLINIC | Age: 47
End: 2022-02-21

## 2022-02-21 NOTE — TELEPHONE ENCOUNTER
Called patient to advise her that she needs to come in an fill out a medical release to obtain her records or sign one with her new PCP and have her fax to us so we can send them to that office.   Left message to call on voicemail

## 2022-02-21 NOTE — TELEPHONE ENCOUNTER
----- Message from Geovany Sanchez sent at 2/18/2022  1:23 PM EST -----  Subject: Message to Provider    QUESTIONS  Information for Provider? patient would like to pick medical records . Former patient of Dr Brandy Pal. please call her when ready to pick.   ---------------------------------------------------------------------------  --------------  CALL BACK INFO  What is the best way for the office to contact you? OK to leave message on   voicemail  Preferred Call Back Phone Number? [phone]  ---------------------------------------------------------------------------  --------------  SCRIPT ANSWERS  Relationship to Patient?  Self

## 2022-03-09 ENCOUNTER — TELEPHONE (OUTPATIENT)
Dept: INTERNAL MEDICINE CLINIC | Age: 47
End: 2022-03-09

## 2022-03-09 RX ORDER — POTASSIUM CHLORIDE 750 MG/1
10 TABLET, FILM COATED, EXTENDED RELEASE ORAL DAILY
Qty: 90 TABLET | Refills: 0 | Status: SHIPPED | OUTPATIENT
Start: 2022-03-09 | End: 2022-09-22 | Stop reason: SDUPTHER

## 2022-03-09 RX ORDER — MONTELUKAST SODIUM 10 MG/1
10 TABLET ORAL DAILY
Qty: 90 TABLET | Refills: 0 | Status: SHIPPED | OUTPATIENT
Start: 2022-03-09 | End: 2022-08-05 | Stop reason: SDUPTHER

## 2022-03-09 NOTE — TELEPHONE ENCOUNTER
Patient is requesting Montelukast 10 mg Qty 90 day and Potassium Chloride SR 10 mg Qty 90 day to [facility] [address] [facility] [place]

## 2022-03-19 PROBLEM — I10 HYPERTENSIVE DISORDER: Status: ACTIVE | Noted: 2021-01-07

## 2022-04-25 ENCOUNTER — VIRTUAL VISIT (OUTPATIENT)
Dept: FAMILY MEDICINE CLINIC | Age: 47
End: 2022-04-25
Payer: COMMERCIAL

## 2022-04-25 DIAGNOSIS — E78.00 HYPERCHOLESTEROLEMIA: ICD-10-CM

## 2022-04-25 DIAGNOSIS — Z11.59 NEED FOR HEPATITIS C SCREENING TEST: ICD-10-CM

## 2022-04-25 DIAGNOSIS — Z13.1 SCREENING FOR DIABETES MELLITUS: ICD-10-CM

## 2022-04-25 DIAGNOSIS — I10 ESSENTIAL HYPERTENSION: Primary | ICD-10-CM

## 2022-04-25 PROCEDURE — 99213 OFFICE O/P EST LOW 20 MIN: CPT | Performed by: FAMILY MEDICINE

## 2022-04-25 NOTE — PROGRESS NOTES
Patient stated name &     Chief Complaint   Patient presents with    Medication Refill     All medications        Health Maintenance Due   Topic    Hepatitis C Screening     COVID-19 Vaccine (1)    DTaP/Tdap/Td series (1 - Tdap)    Cervical cancer screen        Wt Readings from Last 3 Encounters:   10/21/21 233 lb (105.7 kg)   21 239 lb (108.4 kg)   21 233 lb 3.2 oz (105.8 kg)     Temp Readings from Last 3 Encounters:   10/21/21 98.2 °F (36.8 °C)   21 98.1 °F (36.7 °C) (Skin)   21 98.6 °F (37 °C) (Oral)     BP Readings from Last 3 Encounters:   10/21/21 112/80   21 124/80   21 124/82     Pulse Readings from Last 3 Encounters:   10/21/21 80   21 78   21 71         Learning Assessment:  :     Learning Assessment 2021   PRIMARY LEARNER Patient   HIGHEST LEVEL OF EDUCATION - PRIMARY LEARNER  4 YEARS OF COLLEGE   BARRIERS PRIMARY LEARNER NONE   PRIMARY LANGUAGE ENGLISH   LEARNER PREFERENCE PRIMARY VIDEOS   ANSWERED BY patient   RELATIONSHIP SELF       Depression Screening:  :     3 most recent PHQ Screens 10/21/2021   Little interest or pleasure in doing things Not at all   Feeling down, depressed, irritable, or hopeless Not at all   Total Score PHQ 2 0       Fall Risk Assessment:  :     No flowsheet data found. Abuse Screening:  :     Abuse Screening Questionnaire 2021   Do you ever feel afraid of your partner? N N   Are you in a relationship with someone who physically or mentally threatens you? N N   Is it safe for you to go home?  Y Y       Coordination of Care Questionnaire:  :     1) Have you been to an emergency room, urgent care clinic since your last visit? no   Hospitalized since your last visit? no             2) Have you seen or consulted any other health care providers outside of [address] [place] [address] since your last visit? no  (Include any pap smears or colon screenings in this section.)    3) Do you have an Advance Directive on file? no  Are you interested in receiving information about Advance Directives? no    Patient is accompanied by self I have received verbal consent from Audra Rubi to discuss any/all medical information while they are present in the room. Pt will be medically stable for discharge when definitive ABX plan is in place  Follow up to consult PT for safe discharge recommendation  Care management following for discharge planning DVT :  Heparin SQ  GI: Continue Protonix DVT :  Heparin SQ  GI: Continue Protonix DVT :  Heparin SQ  GI: Continue Protonix DVT :  Heparin SQ  GI: Continue Protonix DVT :  Heparin SQ  GI: Continue Protonix Pt will be medically stable for discharge when definitive ABX plan is in place  Follow up to consult PT for safe discharge recommendation  Care management following for discharge planning Pending surgical path result, ID clearance   PT rec ZACHARY  Care management following for discharge planning DVT :  Heparin SQ  GI: Continue Protonix

## 2022-04-25 NOTE — PROGRESS NOTES
Krys Bojorquez is a 42 y.o. female who was seen by synchronous (real-time) audio-video technology on 4/25/2022 for Medication Refill (All medications)    Needs labs and refill on meds  Also needs labs  No c/o chest pain, sob or muscle spsms  She is taking HCTZ for HTN  Also asking about ref on singulair  It appears it was refilled by her old pcp in march for 90 days      Assessment & Plan:   Diagnoses and all orders for this visit:    1. Essential hypertension  -     METABOLIC PANEL, COMPREHENSIVE; Future  No neg side effects, doing well  No change in meds needed at this time  2. Screening for diabetes mellitus  -     HEMOGLOBIN A1C WITH EAG; Future  Has not been checked  Her bmi is in obesity zone so she is high risk  3. Hypercholesterolemia  -     LIPID PANEL; Future  Come in fasting for lip panel  4. Need for hepatitis C screening test  -     HEPATITIS C AB; Future            Subjective:       Prior to Admission medications    Medication Sig Start Date End Date Taking? Authorizing Provider   hydroCHLOROthiazide (HYDRODIURIL) 25 mg tablet TAKE 1 TABLET BY MOUTH EVERY DAY 4/5/22  Yes Blade Lira MD   montelukast (SINGULAIR) 10 mg tablet Take 1 Tablet by mouth daily. 3/9/22 6/1/25 Yes Ludin Russell MD   potassium chloride SR (KLOR-CON 10) 10 mEq tablet Take 1 Tablet by mouth daily. 3/9/22  Yes Ludin Russell MD   Blood Pressure Kit-Extra Large kit 1 Device by Does Not Apply route two (2) times a day. Patient not taking: Reported on 7/19/2021 7/14/21   Tabatha Randolph NP   miscellaneous medical supply (Blood Pressure Cuff) misc 1 Device by Does Not Apply route daily.   Patient not taking: Reported on 7/14/2021 5/30/21   Dell Worley MD     Patient Active Problem List    Diagnosis Date Noted    Hypertensive disorder 01/07/2021     Current Outpatient Medications   Medication Sig Dispense Refill    hydroCHLOROthiazide (HYDRODIURIL) 25 mg tablet TAKE 1 TABLET BY MOUTH EVERY DAY 90 Tablet 0    montelukast (SINGULAIR) 10 mg tablet Take 1 Tablet by mouth daily. 90 Tablet 0    potassium chloride SR (KLOR-CON 10) 10 mEq tablet Take 1 Tablet by mouth daily. 90 Tablet 0    Blood Pressure Kit-Extra Large kit 1 Device by Does Not Apply route two (2) times a day. (Patient not taking: Reported on 7/19/2021) 1 Kit 0    miscellaneous medical supply (Blood Pressure Cuff) misc 1 Device by Does Not Apply route daily.  (Patient not taking: Reported on 7/14/2021) 1 Each 0       ROS    Objective:     Patient-Reported Vitals 7/19/2021   Patient-Reported Weight 239lb        [INSTRUCTIONS:  \"[x]\" Indicates a positive item  \"[]\" Indicates a negative item  -- DELETE ALL ITEMS NOT EXAMINED]    Constitutional: [x] Appears well-developed and well-nourished [x] No apparent distress      [] Abnormal -     Mental status: [x] Alert and awake  [x] Oriented to person/place/time [x] Able to follow commands    [] Abnormal -     Eyes:   EOM    [x]  Normal    [] Abnormal -   Sclera  [x]  Normal    [] Abnormal -          Discharge [x]  None visible   [] Abnormal -     HENT: [x] Normocephalic, atraumatic  [] Abnormal -   [x] Mouth/Throat: Mucous membranes are moist    External Ears [x] Normal  [] Abnormal -    Neck: [x] No visualized mass [] Abnormal -     Pulmonary/Chest: [x] Respiratory effort normal   [x] No visualized signs of difficulty breathing or respiratory distress        [] Abnormal -      Musculoskeletal:   [x] Normal gait with no signs of ataxia         [x] Normal range of motion of neck        [] Abnormal -     Neurological:        [x] No Facial Asymmetry (Cranial nerve 7 motor function) (limited exam due to video visit)          [x] No gaze palsy        [] Abnormal -          Skin:        [x] No significant exanthematous lesions or discoloration noted on facial skin         [] Abnormal -            Psychiatric:       [x] Normal Affect [] Abnormal -        [x] No Hallucinations    Other pertinent observable physical exam findings:-        We discussed the expected course, resolution and complications of the diagnosis(es) in detail. Medication risks, benefits, costs, interactions, and alternatives were discussed as indicated. I advised her to contact the office if her condition worsens, changes or fails to improve as anticipated. She expressed understanding with the diagnosis(es) and plan. Bonita Crooks, was evaluated through a synchronous (real-time) audio-video encounter. The patient (or guardian if applicable) is aware that this is a billable service, which includes applicable co-pays. Verbal consent to proceed has been obtained. The visit was conducted pursuant to the emergency declaration under the [facility][note 3][facility], [address] [place] [address] authority and the [employer] General Act. Patient identification was verified, and a caregiver was present when appropriate. The patient was located at home in a state where the provider was licensed to provide care.       Jey Watkins MD

## 2022-07-21 DIAGNOSIS — I10 ESSENTIAL HYPERTENSION: ICD-10-CM

## 2022-07-27 DIAGNOSIS — I10 ESSENTIAL HYPERTENSION: ICD-10-CM

## 2022-07-27 RX ORDER — HYDROCHLOROTHIAZIDE 25 MG/1
25 TABLET ORAL DAILY
Qty: 90 TABLET | Refills: 0 | Status: SHIPPED | OUTPATIENT
Start: 2022-07-27 | End: 2022-10-21

## 2022-07-27 RX ORDER — HYDROCHLOROTHIAZIDE 25 MG/1
25 TABLET ORAL DAILY
Qty: 90 TABLET | Refills: 0 | OUTPATIENT
Start: 2022-07-27

## 2022-07-27 RX ORDER — HYDROCHLOROTHIAZIDE 25 MG/1
TABLET ORAL
Qty: 90 TABLET | Refills: 0 | OUTPATIENT
Start: 2022-07-27

## 2022-07-27 NOTE — TELEPHONE ENCOUNTER
Please advise!!    .PCP: Jem Holly MD    Last appt: 7/22/2022  Future Appointments   Date Time Provider Juan Brooks   8/1/2022  3:15 PM Jem Holly MD [facility]       Requested Prescriptions      No prescriptions requested or ordered in this encounter       Prior labs and Blood pressures:  BP Readings from Last 3 Encounters:   10/21/21 112/80   07/14/21 124/80   01/18/21 124/82     Lab Results   Component Value Date/Time    Sodium 138 07/22/2022 09:17 AM    Potassium 3.8 07/22/2022 09:17 AM    Chloride 105 07/22/2022 09:17 AM    CO2 29 07/22/2022 09:17 AM    Anion gap 4 (L) 07/22/2022 09:17 AM    Glucose 110 (H) 07/22/2022 09:17 AM    BUN 8 07/22/2022 09:17 AM    Creatinine 0.80 07/22/2022 09:17 AM    BUN/Creatinine ratio 10 (L) 07/22/2022 09:17 AM    GFR est AA >60 07/22/2022 09:17 AM    GFR est non-AA >60 07/22/2022 09:17 AM    Calcium 9.2 07/22/2022 09:17 AM     Lab Results   Component Value Date/Time    Hemoglobin A1c 5.8 (H) 07/22/2022 09:17 AM     Lab Results   Component Value Date/Time    Cholesterol, total 151 07/22/2022 09:17 AM    HDL Cholesterol 61 07/22/2022 09:17 AM    LDL, calculated 76.4 07/22/2022 09:17 AM    VLDL, calculated 13.6 07/22/2022 09:17 AM    Triglyceride 68 07/22/2022 09:17 AM    CHOL/HDL Ratio 2.5 07/22/2022 09:17 AM     No results found for: Maicol Sorto [id number]    Lab Results   Component Value Date/Time    TSH 1.160 10/21/2021 02:46 PM

## 2022-08-01 ENCOUNTER — VIRTUAL VISIT (OUTPATIENT)
Dept: FAMILY MEDICINE CLINIC | Age: 47
End: 2022-08-01
Payer: COMMERCIAL

## 2022-08-01 DIAGNOSIS — E78.00 HYPERCHOLESTEROLEMIA: ICD-10-CM

## 2022-08-01 DIAGNOSIS — I10 ESSENTIAL HYPERTENSION: Primary | ICD-10-CM

## 2022-08-01 DIAGNOSIS — R73.9 BLOOD GLUCOSE ELEVATED: ICD-10-CM

## 2022-08-01 PROCEDURE — 99213 OFFICE O/P EST LOW 20 MIN: CPT | Performed by: FAMILY MEDICINE

## 2022-08-01 NOTE — PROGRESS NOTES
Sonya Nguyen is a 42 y.o. female who was seen by synchronous (real-time) audio-video technology on 8/1/2022 for Follow-up  Has h/o htn and taking hctz  Labs done last week  The blood sugar is in the prediabetes range at 5.8  Everything else is normal including the lipids      Assessment & Plan:   Diagnoses and all orders for this visit:    1. Essential hypertension  No change in meds  Doing well  2. Blood glucose elevated  Exercise at least 30 min a day 5 days a week  Avoid sweets and stsrches  Recheck 3 mos  3. Hypercholesterolemia    Cholesterol levels were good      Subjective:       Prior to Admission medications    Medication Sig Start Date End Date Taking? Authorizing Provider   hydroCHLOROthiazide (HYDRODIURIL) 25 mg tablet Take 1 Tablet by mouth in the morning. 7/27/22  Yes Meek Ramos MD   montelukast (SINGULAIR) 10 mg tablet Take 1 Tablet by mouth daily. 3/9/22 6/1/25 Yes Carlos Sandoval MD   potassium chloride SR (KLOR-CON 10) 10 mEq tablet Take 1 Tablet by mouth daily. 3/9/22  Yes Carlos Sandoval MD   Blood Pressure Kit-Extra Large kit 1 Device by Does Not Apply route two (2) times a day. Patient not taking: Reported on 8/1/2022 7/14/21   Bruce Koch NP   miscellaneous medical supply (Blood Pressure Cuff) misc 1 Device by Does Not Apply route daily. Patient not taking: Reported on 7/14/2021 5/30/21   Fritz Polo MD     Patient Active Problem List    Diagnosis Date Noted    Hypertensive disorder 01/07/2021     Current Outpatient Medications   Medication Sig Dispense Refill    hydroCHLOROthiazide (HYDRODIURIL) 25 mg tablet Take 1 Tablet by mouth in the morning. 90 Tablet 0    montelukast (SINGULAIR) 10 mg tablet Take 1 Tablet by mouth daily. 90 Tablet 0    potassium chloride SR (KLOR-CON 10) 10 mEq tablet Take 1 Tablet by mouth daily. 90 Tablet 0    Blood Pressure Kit-Extra Large kit 1 Device by Does Not Apply route two (2) times a day.  (Patient not taking: Reported on 8/1/2022) 1 Kit 0 miscellaneous medical supply (Blood Pressure Cuff) misc 1 Device by Does Not Apply route daily. (Patient not taking: Reported on 7/14/2021) 1 Each 0       ROS    Objective:     Patient-Reported Vitals 8/1/2022   Patient-Reported Weight 240lb        [INSTRUCTIONS:  \"[x]\" Indicates a positive item  \"[]\" Indicates a negative item  -- DELETE ALL ITEMS NOT EXAMINED]    Constitutional: [x] Appears well-developed and well-nourished [x] No apparent distress      [] Abnormal -     Mental status: [x] Alert and awake  [x] Oriented to person/place/time [x] Able to follow commands    [] Abnormal -     Eyes:   EOM    [x]  Normal    [] Abnormal -   Sclera  [x]  Normal    [] Abnormal -          Discharge [x]  None visible   [] Abnormal -     HENT: [x] Normocephalic, atraumatic  [] Abnormal -   [x] Mouth/Throat: Mucous membranes are moist    External Ears [x] Normal  [] Abnormal -    Neck: [x] No visualized mass [] Abnormal -     Pulmonary/Chest: [x] Respiratory effort normal   [x] No visualized signs of difficulty breathing or respiratory distress        [] Abnormal -      Musculoskeletal:   [x] Normal gait with no signs of ataxia         [x] Normal range of motion of neck        [] Abnormal -     Neurological:        [x] No Facial Asymmetry (Cranial nerve 7 motor function) (limited exam due to video visit)          [x] No gaze palsy        [] Abnormal -          Skin:        [x] No significant exanthematous lesions or discoloration noted on facial skin         [] Abnormal -            Psychiatric:       [x] Normal Affect [] Abnormal -        [x] No Hallucinations    Other pertinent observable physical exam findings:-        We discussed the expected course, resolution and complications of the diagnosis(es) in detail. Medication risks, benefits, costs, interactions, and alternatives were discussed as indicated. I advised her to contact the office if her condition worsens, changes or fails to improve as anticipated.  She expressed understanding with the diagnosis(es) and plan. Fan Bauman, was evaluated through a synchronous (real-time) audio-video encounter. The patient (or guardian if applicable) is aware that this is a billable service, which includes applicable co-pays. This Virtual Visit was conducted with patient's (and/or legal guardian's) consent. The visit was conducted pursuant to the emergency declaration under the [address] [place] [address] and the [employer] and [employer] General Act. Patient identification was verified, and a caregiver was present when appropriate.   The patient was located at: Home: [address] [facility] [id number]  The provider was located at: Home: [unfilled]        Ailyn Strong MD

## 2022-08-01 NOTE — PROGRESS NOTES
1. Have you been to the ER, urgent care clinic since your last visit? Hospitalized since your last visit? No    2. Have you seen or consulted any other health care providers outside of the [address] [place] [address] since your last visit? Include any pap smears or colon screening. No    Chief Complaint   Patient presents with    Follow-up     Health Maintenance Due   Topic Date Due    COVID-19 Vaccine (1) Never done    DTaP/Tdap/Td series (1 - Tdap) Never done    Cervical cancer screen  Never done     3 most recent PHQ Screens 8/1/2022   Little interest or pleasure in doing things Not at all   Feeling down, depressed, irritable, or hopeless Not at all   Total Score PHQ 2 0     Abuse Screening Questionnaire 8/1/2022   Do you ever feel afraid of your partner? N   Are you in a relationship with someone who physically or mentally threatens you? N   Is it safe for you to go home?  Y     Learning Assessment 7/14/2021   PRIMARY LEARNER Patient   HIGHEST LEVEL OF EDUCATION - PRIMARY LEARNER  4 YEARS OF COLLEGE   BARRIERS PRIMARY LEARNER NONE   PRIMARY LANGUAGE ENGLISH   LEARNER PREFERENCE PRIMARY VIDEOS   ANSWERED BY patient   RELATIONSHIP SELF     Patient-Reported Vitals 8/1/2022   Patient-Reported Weight 240lb

## 2022-08-15 ENCOUNTER — VIRTUAL VISIT (OUTPATIENT)
Dept: FAMILY MEDICINE CLINIC | Age: 47
End: 2022-08-15
Payer: COMMERCIAL

## 2022-08-15 DIAGNOSIS — L30.8 OTHER ECZEMA: Primary | ICD-10-CM

## 2022-08-15 PROCEDURE — 99213 OFFICE O/P EST LOW 20 MIN: CPT | Performed by: FAMILY MEDICINE

## 2022-08-15 RX ORDER — TRIAMCINOLONE ACETONIDE 1 MG/G
OINTMENT TOPICAL 2 TIMES DAILY
COMMUNITY
End: 2022-08-15 | Stop reason: SDUPTHER

## 2022-08-15 RX ORDER — CETIRIZINE HYDROCHLORIDE 10 MG/1
10 TABLET, FILM COATED ORAL DAILY
COMMUNITY
Start: 2022-06-13 | End: 2022-08-15 | Stop reason: SDUPTHER

## 2022-08-15 RX ORDER — CETIRIZINE HYDROCHLORIDE 10 MG/1
10 TABLET, FILM COATED ORAL DAILY
Qty: 30 TABLET | Refills: 2 | Status: SHIPPED | OUTPATIENT
Start: 2022-08-15

## 2022-08-15 RX ORDER — TRIAMCINOLONE ACETONIDE 1 MG/G
OINTMENT TOPICAL 2 TIMES DAILY
Qty: 30 G | Refills: 1 | Status: SHIPPED | OUTPATIENT
Start: 2022-08-15

## 2022-08-15 NOTE — PROGRESS NOTES
Chris Riojas is a 42 y.o. female who was seen by synchronous (real-time) audio-video technology on 8/15/2022 for Medication Refill and Referral Request (New Dermatologist due to insurance. )  She needs a refill on steroid ointment for eczema   Her current derm no longer takes her insurance  And she needs a referral to another doc      Assessment & Plan:   Diagnoses and all orders for this visit:    1. Other eczema  -     triamcinolone acetonide (KENALOG) 0.1 % ointment; Apply  to affected area two (2) times a day. use thin layer  -     Allergy Relief, cetirizine, 10 mg tablet; Take 1 Tablet by mouth in the morning. Subjective:       Prior to Admission medications    Medication Sig Start Date End Date Taking? Authorizing Provider   triamcinolone acetonide (KENALOG) 0.1 % ointment Apply  to affected area two (2) times a day. use thin layer 8/15/22  Yes Rosaura Jay MD   Allergy Relief, cetirizine, 10 mg tablet Take 1 Tablet by mouth in the morning. 8/15/22  Yes Rosaura Jay MD   montelukast (SINGULAIR) 10 mg tablet Take 1 Tablet by mouth in the morning. 8/5/22 10/28/25 Yes Rosaura Jay MD   hydroCHLOROthiazide (HYDRODIURIL) 25 mg tablet Take 1 Tablet by mouth in the morning. 7/27/22  Yes Rosaura Jay MD   potassium chloride SR (KLOR-CON 10) 10 mEq tablet Take 1 Tablet by mouth daily. 3/9/22  Yes Napoleon Uriarte MD   Blood Pressure Kit-Extra Large kit 1 Device by Does Not Apply route two (2) times a day. 7/14/21  Yes Ifeoma Harvey NP   miscellaneous medical supply (Blood Pressure Cuff) misc 1 Device by Does Not Apply route daily. 5/30/21  Yes Alexandra Escoto MD   Allergy Relief, cetirizine, 10 mg tablet Take 10 mg by mouth daily. 6/13/22 8/15/22  Provider, Historical   triamcinolone acetonide (KENALOG) 0.1 % ointment Apply  to affected area two (2) times a day.  use thin layer  8/15/22  Provider, Historical     Patient Active Problem List    Diagnosis Date Noted    Hypertensive disorder 01/07/2021 Current Outpatient Medications   Medication Sig Dispense Refill    triamcinolone acetonide (KENALOG) 0.1 % ointment Apply  to affected area two (2) times a day. use thin layer 30 g 1    Allergy Relief, cetirizine, 10 mg tablet Take 1 Tablet by mouth in the morning. 30 Tablet 2    montelukast (SINGULAIR) 10 mg tablet Take 1 Tablet by mouth in the morning. 90 Tablet 3    hydroCHLOROthiazide (HYDRODIURIL) 25 mg tablet Take 1 Tablet by mouth in the morning. 90 Tablet 0    potassium chloride SR (KLOR-CON 10) 10 mEq tablet Take 1 Tablet by mouth daily. 90 Tablet 0    Blood Pressure Kit-Extra Large kit 1 Device by Does Not Apply route two (2) times a day. 1 Kit 0    miscellaneous medical supply (Blood Pressure Cuff) misc 1 Device by Does Not Apply route daily.  1 Each 0       ROS    Objective:     Patient-Reported Vitals 8/15/2022   Patient-Reported Weight 235lb        [INSTRUCTIONS:  \"[x]\" Indicates a positive item  \"[]\" Indicates a negative item  -- DELETE ALL ITEMS NOT EXAMINED]    Constitutional: [x] Appears well-developed and well-nourished [x] No apparent distress      [] Abnormal -     Mental status: [x] Alert and awake  [x] Oriented to person/place/time [x] Able to follow commands    [] Abnormal -     Eyes:   EOM    [x]  Normal    [] Abnormal -   Sclera  [x]  Normal    [] Abnormal -          Discharge [x]  None visible   [] Abnormal -     HENT: [x] Normocephalic, atraumatic  [] Abnormal -   [x] Mouth/Throat: Mucous membranes are moist    External Ears [x] Normal  [] Abnormal -    Neck: [x] No visualized mass [] Abnormal -     Pulmonary/Chest: [x] Respiratory effort normal   [x] No visualized signs of difficulty breathing or respiratory distress        [] Abnormal -      Musculoskeletal:   [x] Normal gait with no signs of ataxia         [x] Normal range of motion of neck        [] Abnormal -     Neurological:        [x] No Facial Asymmetry (Cranial nerve 7 motor function) (limited exam due to video visit) [x] No gaze palsy        [] Abnormal -          Skin:        [x] No significant exanthematous lesions or discoloration noted on facial skin         [] Abnormal -            Psychiatric:       [x] Normal Affect [] Abnormal -        [x] No Hallucinations    Other pertinent observable physical exam findings:-        We discussed the expected course, resolution and complications of the diagnosis(es) in detail. Medication risks, benefits, costs, interactions, and alternatives were discussed as indicated. I advised her to contact the office if her condition worsens, changes or fails to improve as anticipated. She expressed understanding with the diagnosis(es) and plan. Thomas Hickman, was evaluated through a synchronous (real-time) audio-video encounter. The patient (or guardian if applicable) is aware that this is a billable service, which includes applicable co-pays. This Virtual Visit was conducted with patient's (and/or legal guardian's) consent. The visit was conducted pursuant to the emergency declaration under the [address] [place] [address] authority and the [employer] and [employer] General Act. Patient identification was verified, and a caregiver was present when appropriate.   The patient was located at: Home: [address] [facility] [id number]  The provider was located at: Home: [unfilled]        Joanie Beach MD

## 2022-08-15 NOTE — PROGRESS NOTES
1. Have you been to the ER, urgent care clinic since your last visit? Hospitalized since your last visit? No    2. Have you seen or consulted any other health care providers outside of the [address] [place] [address] since your last visit? Include any pap smears or colon screening. No  Chief Complaint   Patient presents with    Medication Refill    Referral Request     New Dermatologist due to insurance. Health Maintenance Due   Topic Date Due    COVID-19 Vaccine (1) Never done    DTaP/Tdap/Td series (1 - Tdap) Never done    Cervical cancer screen  Never done     3 most recent PHQ Screens 8/15/2022   Little interest or pleasure in doing things Not at all   Feeling down, depressed, irritable, or hopeless Not at all   Total Score PHQ 2 0     Abuse Screening Questionnaire 8/15/2022   Do you ever feel afraid of your partner? N   Are you in a relationship with someone who physically or mentally threatens you? N   Is it safe for you to go home?  Y     Learning Assessment 7/14/2021   PRIMARY LEARNER Patient   HIGHEST LEVEL OF EDUCATION - PRIMARY LEARNER  4 YEARS OF COLLEGE   BARRIERS PRIMARY LEARNER NONE   PRIMARY LANGUAGE ENGLISH   LEARNER PREFERENCE PRIMARY VIDEOS   ANSWERED BY patient   RELATIONSHIP SELF     Patient-Reported Vitals 8/15/2022   Patient-Reported Weight 235lb

## 2022-09-13 ENCOUNTER — OFFICE VISIT (OUTPATIENT)
Dept: PODIATRY | Age: 47
End: 2022-09-13
Payer: COMMERCIAL

## 2022-09-13 VITALS
HEART RATE: 70 BPM | HEIGHT: 69 IN | DIASTOLIC BLOOD PRESSURE: 90 MMHG | SYSTOLIC BLOOD PRESSURE: 139 MMHG | BODY MASS INDEX: 34.91 KG/M2 | TEMPERATURE: 97.7 F | OXYGEN SATURATION: 99 %

## 2022-09-13 DIAGNOSIS — B35.1 ONYCHOMYCOSIS: ICD-10-CM

## 2022-09-13 DIAGNOSIS — L60.0 INGROWN NAIL OF GREAT TOE OF LEFT FOOT: ICD-10-CM

## 2022-09-13 DIAGNOSIS — L60.0 INGROWN NAIL OF GREAT TOE OF RIGHT FOOT: Primary | ICD-10-CM

## 2022-09-13 PROCEDURE — 99203 OFFICE O/P NEW LOW 30 MIN: CPT | Performed by: PODIATRIST

## 2022-09-13 PROCEDURE — 11755 BIOPSY NAIL UNIT: CPT | Performed by: PODIATRIST

## 2022-09-13 NOTE — PROGRESS NOTES
[facility]    Subjective:         Patient is a 42 y.o. female who is being seen as a new pt for bilateral big toe pain and thick/discolored toenails to the right second and third digits. Patient states she has suffered with chronic ingrown's for many years. She states she presents to a local nail shop and they remove the ingrown's without incident. She states after her last visit to the nail shop she developed thick/discolored toenails to the right second and third digits. She states she is tried multiple over-the-counter medications without relief of her symptoms. She states diagnostic testing is never been performed to confirm a diagnosis. She states bilateral big toe pain rest level 3 out of 10 and is exacerbated with closed toe shoes and weightbearing. She denies any overt trauma. She denies any recent changes in her shoe gear or activity level. She denies any other lower extremity complaints      Past Medical History:   Diagnosis Date    Abnormal Pap smear      repeat WNL    Allergies     Anemia     Essential hypertension     chronic hypertension    Sickle cell trait (HCC)     Sickle-cell disease, unspecified     pt and  has trait, fetus does not per pt     Past Surgical History:   Procedure Laterality Date    HX HERNIA REPAIR          [place]  DELIVERY ONLY      2001- fetal distress       Family History   Problem Relation Age of Onset    Alcohol abuse Mother     OSTEOARTHRITIS Father     Elevated Lipids Father     Heart Disease Father     Hypertension Father     Cancer Maternal Aunt       Social History     Tobacco Use    Smoking status: Never    Smokeless tobacco: Never   Substance Use Topics    Alcohol use: Yes     Alcohol/week: 1.0 standard drink     Types: 1 Glasses of wine per week     Allergies   Allergen Reactions    Shellfish Derived Hives and Swelling     Prior to Admission medications    Medication Sig Start Date End Date Taking?  Authorizing Provider   triamcinolone acetonide (KENALOG) 0.1 % ointment Apply  to affected area two (2) times a day. use thin layer 8/15/22   Gianfranco Martinez MD   Allergy Relief, cetirizine, 10 mg tablet Take 1 Tablet by mouth in the morning. 8/15/22   Gianfranco Martinez MD   montelukast (SINGULAIR) 10 mg tablet Take 1 Tablet by mouth in the morning. 8/5/22 10/28/25  Gianfranco Martinez MD   hydroCHLOROthiazide (HYDRODIURIL) 25 mg tablet Take 1 Tablet by mouth in the morning. 7/27/22   Gianfranco Martinez MD   potassium chloride SR (KLOR-CON 10) 10 mEq tablet Take 1 Tablet by mouth daily. 3/9/22   Will vOalle MD   Blood Pressure Kit-Extra Large kit 1 Device by Does Not Apply route two (2) times a day. 7/14/21   Reba Quach NP   miscellaneous medical supply (Blood Pressure Cuff) misc 1 Device by Does Not Apply route daily. 5/30/21   Padilla Covarrubias MD       Review of Systems   Constitutional: Negative. HENT: Negative. Eyes: Negative. Respiratory: Negative. Cardiovascular: Negative. Gastrointestinal: Negative. Endocrine: Negative. Genitourinary: Negative. Musculoskeletal: Negative. Skin: Negative. Allergic/Immunologic: Negative. Neurological: Negative. Hematological: Negative. Psychiatric/Behavioral: Negative. All other systems reviewed and are negative. Objective:     Visit Vitals  BP (!) 139/90 (BP 1 Location: Left upper arm, BP Patient Position: Sitting, BP Cuff Size: Adult)   Pulse 70   Temp 97.7 °F (36.5 °C) (Temporal)   Ht 5' 8.5\" (1.74 m)   SpO2 99%   BMI 34.91 kg/m²       Physical Exam  Vitals reviewed. Constitutional:       Appearance: She is morbidly obese. Cardiovascular:      Pulses:           Dorsalis pedis pulses are 2+ on the right side and 2+ on the left side. Posterior tibial pulses are 2+ on the right side and 2+ on the left side. Pulmonary:      Effort: Pulmonary effort is normal.   Musculoskeletal:      Right lower leg: No edema. Left lower leg: No edema. Right foot: Normal range of motion. No deformity or bunion. Left foot: Normal range of motion. No deformity or bunion. Feet:      Right foot:      Protective Sensation: 10 sites tested. 10 sites sensed. Skin integrity: Skin integrity normal.      Toenail Condition: Right toenails are abnormally thick and ingrown. Fungal disease present. Left foot:      Protective Sensation: 10 sites tested. 10 sites sensed. Skin integrity: Skin integrity normal.      Toenail Condition: Left toenails are abnormally thick and ingrown. Fungal disease present. Lymphadenopathy:      Lower Body: No right inguinal adenopathy. No left inguinal adenopathy. Skin:     General: Skin is warm. Capillary Refill: Capillary refill takes 2 to 3 seconds. Neurological:      Mental Status: She is alert and oriented to person, place, and time. Psychiatric:         Mood and Affect: Mood and affect normal.       Data Review: No results found for this or any previous visit (from the past 24 hour(s)). Impression:       ICD-10-CM ICD-9-CM    1. Ingrown nail of great toe of right foot  L60.0 703.0       2. Ingrown nail of great toe of left foot  L60.0 703.0       3. Onychomycosis  B35.1 110.1 BIOPSY, NAIL UNIT          Recommendation:     Patient seen and evaluated in the office  Discussed and educated patient regarding her current medical condition, possible etiologies of her symptoms and various treatment options  A slant back procedure was performed the offending nail borders of the bilateral great toes with a nail nipper without incident. Patient tolerated well no dressing was needed  Lastly, i was determined that a biopsy of the nail unit would be taken for diagnostic purposes. A small portion of the nail unit (eg, plate, bed, matrix, hyponychium, proximal and lateral nail folds) was sharply removed from the right great toenail unit and sent to pathology for analysis.  Anesthesia and hemostasis was utilized as needed. Wound care performed as needed. Pt tolerated well. Instructed pt that when pathology results return, will discuss tx options in more depth. Rishi Russ, [facility], [facility] [facility] and [facility]  [address] [place] [address]  O: [phone]  F: [phone]  C: [phone]

## 2022-09-13 NOTE — PROGRESS NOTES
1. \"Have you been to the ER, urgent care clinic since your last visit? Hospitalized since your last visit? \" No    2. \"Have you seen or consulted any other health care providers outside of the [address] [place] [address] since your last visit? \" No     3. For patients aged 39-70: Has the patient had a colonoscopy / FIT/ Cologuard? NA - based on age      If the patient is female:    4. For patients aged 41-77: Has the patient had a mammogram within the past 2 years? Yes - no Care Gap present      5. For patients aged 21-65: Has the patient had a pap smear?  Yes - no Care Gap present      Chief Complaint   Patient presents with    Ingrown Toenail    Nail Problem     fungus

## 2022-09-22 NOTE — TELEPHONE ENCOUNTER
Please advise!!    Patient is completely out of this medication    . PCP: Kishore Zamora MD    Last appt: 8/15/2022  No future appointments.     Requested Prescriptions      No prescriptions requested or ordered in this encounter       Prior labs and Blood pressures:  BP Readings from Last 3 Encounters:   09/13/22 (!) 139/90   10/21/21 112/80   07/14/21 124/80     Lab Results   Component Value Date/Time    Sodium 138 07/22/2022 09:17 AM    Potassium 3.8 07/22/2022 09:17 AM    Chloride 105 07/22/2022 09:17 AM    CO2 29 07/22/2022 09:17 AM    Anion gap 4 (L) 07/22/2022 09:17 AM    Glucose 110 (H) 07/22/2022 09:17 AM    BUN 8 07/22/2022 09:17 AM    Creatinine 0.80 07/22/2022 09:17 AM    BUN/Creatinine ratio 10 (L) 07/22/2022 09:17 AM    GFR est AA >60 07/22/2022 09:17 AM    GFR est non-AA >60 07/22/2022 09:17 AM    Calcium 9.2 07/22/2022 09:17 AM     Lab Results   Component Value Date/Time    Hemoglobin A1c 5.8 (H) 07/22/2022 09:17 AM     Lab Results   Component Value Date/Time    Cholesterol, total 151 07/22/2022 09:17 AM    HDL Cholesterol 61 07/22/2022 09:17 AM    LDL, calculated 76.4 07/22/2022 09:17 AM    VLDL, calculated 13.6 07/22/2022 09:17 AM    Triglyceride 68 07/22/2022 09:17 AM    CHOL/HDL Ratio 2.5 07/22/2022 09:17 AM     No results found for: [place] [id number]    Lab Results   Component Value Date/Time    TSH 1.160 10/21/2021 02:46 PM

## 2022-09-23 RX ORDER — POTASSIUM CHLORIDE 750 MG/1
10 TABLET, FILM COATED, EXTENDED RELEASE ORAL DAILY
Qty: 90 TABLET | Refills: 0 | OUTPATIENT
Start: 2022-09-23

## 2022-10-17 DIAGNOSIS — B35.1 ONYCHOMYCOSIS: Primary | ICD-10-CM

## 2022-10-17 RX ORDER — CICLOPIROX 80 MG/ML
0.25 SOLUTION TOPICAL
Qty: 6.6 ML | Refills: 2 | Status: SHIPPED | OUTPATIENT
Start: 2022-10-17 | End: 2023-01-15

## 2023-01-16 DIAGNOSIS — I10 ESSENTIAL HYPERTENSION: ICD-10-CM

## 2023-01-17 RX ORDER — HYDROCHLOROTHIAZIDE 25 MG/1
25 TABLET ORAL DAILY
Qty: 90 TABLET | Refills: 0 | Status: SHIPPED | OUTPATIENT
Start: 2023-01-17

## 2023-01-30 DIAGNOSIS — L30.8 OTHER ECZEMA: ICD-10-CM

## 2023-02-01 ENCOUNTER — VIRTUAL VISIT (OUTPATIENT)
Dept: FAMILY MEDICINE CLINIC | Age: 48
End: 2023-02-01
Payer: COMMERCIAL

## 2023-02-01 DIAGNOSIS — J01.10 ACUTE NON-RECURRENT FRONTAL SINUSITIS: Primary | ICD-10-CM

## 2023-02-01 DIAGNOSIS — L30.8 OTHER ECZEMA: ICD-10-CM

## 2023-02-01 DIAGNOSIS — B37.31 VAGINA, CANDIDIASIS: ICD-10-CM

## 2023-02-01 PROCEDURE — 99213 OFFICE O/P EST LOW 20 MIN: CPT | Performed by: NURSE PRACTITIONER

## 2023-02-01 RX ORDER — TRIAMCINOLONE ACETONIDE 1 MG/G
OINTMENT TOPICAL 2 TIMES DAILY
Qty: 30 G | Refills: 1 | Status: SHIPPED | OUTPATIENT
Start: 2023-02-01

## 2023-02-01 RX ORDER — FLUCONAZOLE 150 MG/1
150 TABLET ORAL DAILY
Qty: 1 TABLET | Refills: 0 | Status: SHIPPED | OUTPATIENT
Start: 2023-02-01 | End: 2023-02-02

## 2023-02-01 RX ORDER — BENZONATATE 200 MG/1
200 CAPSULE ORAL
Qty: 30 CAPSULE | Refills: 0 | Status: SHIPPED | OUTPATIENT
Start: 2023-02-01 | End: 2023-02-08

## 2023-02-01 RX ORDER — AMOXICILLIN AND CLAVULANATE POTASSIUM 875; 125 MG/1; MG/1
1 TABLET, FILM COATED ORAL EVERY 12 HOURS
Qty: 20 TABLET | Refills: 0 | Status: SHIPPED | OUTPATIENT
Start: 2023-02-01 | End: 2023-02-11

## 2023-02-01 NOTE — LETTER
NOTIFICATION RETURN TO WORK / SCHOOL    2/1/2023 11:05 AM    Ms. Nela Mondragon  [address] [facility] [address]      To Whom It May Concern:    Nela Mondragon is currently under the care of [address] [facility]. She will return to work/school on: Please excuse from work on 1/31/23 -2/3/23 for illness    If there are questions or concerns please have the patient contact our office.         Sincerely,      Micaela Rodrigez NP

## 2023-02-01 NOTE — PROGRESS NOTES
Digna Horn (: 1975) is a 41 y.o. female, established patient, here for evaluation of the following chief complaint(s):   Sinus Infection, Cough, and Cold Symptoms       ASSESSMENT/PLAN:  Below is the assessment and plan developed based on review of pertinent history, labs, studies, and medications. 1. Acute non-recurrent frontal sinusitis  -     benzonatate (TESSALON) 200 mg capsule; Take 1 Capsule by mouth three (3) times daily as needed for Cough for up to 7 days. , Normal, Disp-30 Capsule, R-0  -     amoxicillin-clavulanate (AUGMENTIN) 875-125 mg per tablet; Take 1 Tablet by mouth every twelve (12) hours for 10 days. Indications: acute bacterial infection of the sinuses, Normal, Disp-20 Tablet, R-0  2. Vagina, candidiasis  -     fluconazole (DIFLUCAN) 150 mg tablet; Take 1 Tablet by mouth daily for 1 day. FDA advises cautious prescribing of oral fluconazole in pregnancy. Indications: a yeast infection of the vagina and vulva, Normal, Disp-1 Tablet, R-0  Patient was seen by virtual video. Reports several week history of frontal sinusitis like symptoms. Has tried over-the-counter medication with no relief. Patient is also done home COVID test that was negative. Reports nasal congestion, drainage and production of sputum. Recommended Flonase and Mucinex. Medications prescribed patient does report vaginal yeast infection with antibiotics so Diflucan has been added to order. Patient should follow-up if symptoms continue    No follow-ups on file. SUBJECTIVE/OBJECTIVE:  HPI    Review of Systems   Constitutional: Negative. HENT:  Positive for congestion, postnasal drip, rhinorrhea, sinus pressure, sinus pain and sore throat. Eyes: Negative. Respiratory:  Positive for cough. Cardiovascular: Negative. Gastrointestinal: Negative. Musculoskeletal: Negative. Psychiatric/Behavioral: Negative.         No data recorded     Physical Exam            Digna Horn, was evaluated through a synchronous (real-time) audio-video encounter. The patient (or guardian if applicable) is aware that this is a billable service, which includes applicable co-pays. This Virtual Visit was conducted with patient's (and/or legal guardian's) consent. The visit was conducted pursuant to the emergency declaration under the [address] [place] [address] authority and the [employer] and [employer] Act. Patient identification was verified, and a caregiver was present when appropriate. The patient was located at: Home: [address] [facility] [address]  The provider was located at: Facility ([facility]): [address] [address]       An electronic signature was used to authenticate this note.   -- Phillip Corbin NP

## 2023-02-02 RX ORDER — CETIRIZINE HYDROCHLORIDE 10 MG/1
TABLET ORAL
Qty: 30 TABLET | Refills: 2 | Status: SHIPPED | OUTPATIENT
Start: 2023-02-02

## 2023-03-15 ENCOUNTER — TELEPHONE (OUTPATIENT)
Dept: FAMILY MEDICINE CLINIC | Age: 48
End: 2023-03-15

## 2023-03-15 NOTE — TELEPHONE ENCOUNTER
----- Message from Aracelis Zeng sent at 3/15/2023 12:08 PM EDT -----  Subject: Referral Request    Reason for referral request? Patient needs to get her lab work done, she   has this done every 3 months , especially for potassium , please call with   an appointment for Lab and then needs an appointment with a provider to   review results and medication adjustment   Provider patient wants to be referred to(if known):     Provider Phone Number(if known):     Additional Information for Provider?   ---------------------------------------------------------------------------  --------------  [email or web address]    [phone]; OK to leave message on voicemail  ---------------------------------------------------------------------------  --------------

## 2023-04-05 ENCOUNTER — VIRTUAL VISIT (OUTPATIENT)
Dept: FAMILY MEDICINE CLINIC | Age: 48
End: 2023-04-05
Payer: COMMERCIAL

## 2023-04-05 PROCEDURE — 99214 OFFICE O/P EST MOD 30 MIN: CPT | Performed by: FAMILY MEDICINE

## 2023-04-05 RX ORDER — HYDROCHLOROTHIAZIDE 25 MG/1
25 TABLET ORAL DAILY
Qty: 90 TABLET | Refills: 1 | Status: SHIPPED
Start: 2023-04-05

## 2023-04-05 RX ORDER — CETIRIZINE HYDROCHLORIDE 10 MG/1
10 TABLET ORAL DAILY
Qty: 90 TABLET | Refills: 1 | Status: SHIPPED
Start: 2023-04-05

## 2023-04-05 RX ORDER — POTASSIUM CHLORIDE 750 MG/1
10 TABLET, FILM COATED, EXTENDED RELEASE ORAL DAILY
Qty: 90 TABLET | Refills: 1 | Status: SHIPPED
Start: 2023-04-05

## 2023-04-05 RX ORDER — MONTELUKAST SODIUM 10 MG/1
10 TABLET ORAL DAILY
Qty: 90 TABLET | Refills: 3 | Status: SHIPPED
Start: 2023-04-05 | End: 2026-06-28

## 2023-04-05 NOTE — PROGRESS NOTES
Radhames Rivera is a 41 y.o. female      Chief Complaint   Patient presents with    Labs    Hypertension         1. Have you been to the ER, urgent care clinic since your last visit? No Hospitalized since your last visit? 2. Have you seen or consulted any other health care providers outside of the [address] [place] [address] since your last visit? No    Include any pap smears or colon screening.

## 2023-04-05 NOTE — PROGRESS NOTES
Consent: Aubrie Whitfield, who was seen by synchronous (real-time) audio-video technology, and/or her healthcare decision maker, is aware that this patient-initiated, Telehealth encounter on 4/5/2023 is a billable service, with coverage as determined by her insurance carrier. She is aware that she may receive a bill and has provided verbal consent to proceed: Yes. Assessment & Plan:   Diagnoses and all orders for this visit:    1. Environmental and seasonal allergies  -     montelukast (SINGULAIR) 10 mg tablet; Take 1 Tablet by mouth daily. -     cetirizine (ZYRTEC) 10 mg tablet; Take 1 Tablet by mouth daily.  -     CBC WITH AUTOMATED DIFF; Future    2. Essential hypertension  -     hydroCHLOROthiazide (HYDRODIURIL) 25 mg tablet; Take 1 Tablet by mouth daily.  -     THYROID CASCADE PROFILE; Future    3. Other eczema    4. Hypokalemia  -     potassium chloride SR (KLOR-CON 10) 10 mEq tablet; Take 1 Tablet by mouth daily.  -     METABOLIC PANEL, COMPREHENSIVE; Future    5. Mixed hyperlipidemia  -     METABOLIC PANEL, COMPREHENSIVE; Future  -     LIPID PANEL; Future    6. IFG (impaired fasting glucose)  -     HEMOGLOBIN A1C WITH EAG; Future  -     URINALYSIS W/ REFLEX CULTURE; Future  -     MICROALBUMIN, UR, RAND W/ MICROALB/CREAT RATIO; Future  -     CBC WITH AUTOMATED DIFF; Future  -     METABOLIC PANEL, COMPREHENSIVE; Future          Follow-up and Dispositions    Return in about 3 months (around 7/5/2023) for follow up. I ADVISED PATIENT TO GO TO ER IF SYMPTOMS WORSEN , CHANGE OR FAILS TO IMPROVE. I spent at least 15 minutes with this established patient, and >50% of the time was spent counseling and/or coordinating care regarding SEE BELOW  712  Subjective:   Aubrie Whitfield is a 41 y.o. 1975 female  new patient, who was seen for Labs and Hypertension          1. Essential hypertension  The patient presents today for HTN follow-up. Taking medications daily w/o complications.    Home BP readings range from does not remember readings. SIde effects of meds: None  Patient trying to follow low salt diet. Lab Results   Component Value Date/Time    Sodium 138 07/22/2022 09:17 AM    Potassium 3.8 07/22/2022 09:17 AM    Chloride 105 07/22/2022 09:17 AM    CO2 29 07/22/2022 09:17 AM    Anion gap 4 (L) 07/22/2022 09:17 AM    Glucose 110 (H) 07/22/2022 09:17 AM    BUN 8 07/22/2022 09:17 AM    Creatinine 0.80 07/22/2022 09:17 AM    BUN/Creatinine ratio 10 (L) 07/22/2022 09:17 AM    GFR est AA >60 07/22/2022 09:17 AM    GFR est non-AA >60 07/22/2022 09:17 AM    Calcium 9.2 07/22/2022 09:17 AM     No results found for: MCACR, MCA1, MCA2, MCA3, MCAU, MCAU2, MCALPOCT     2. Other eczema  Continue antihistamine. Currently stable    3. Environmental and seasonal allergies  Patient reports symptoms of environmental and seasonal allergies. She takes Zyrtec and Singulair. Symptoms are controlled. Requesting refills. 4. Hypokalemia  She takes potassium supplement. Requesting lab orders to check potassium    Lab Results   Component Value Date/Time    Sodium 138 07/22/2022 09:17 AM    Potassium 3.8 07/22/2022 09:17 AM    Chloride 105 07/22/2022 09:17 AM    CO2 29 07/22/2022 09:17 AM    Anion gap 4 (L) 07/22/2022 09:17 AM    Glucose 110 (H) 07/22/2022 09:17 AM    BUN 8 07/22/2022 09:17 AM    Creatinine 0.80 07/22/2022 09:17 AM    BUN/Creatinine ratio 10 (L) 07/22/2022 09:17 AM    GFR est AA >60 07/22/2022 09:17 AM    GFR est non-AA >60 07/22/2022 09:17 AM    Calcium 9.2 07/22/2022 09:17 AM     No results found for: MCACR, MCA1, MCA2, MCA3, MCAU, MCAU2, MCALPOCT   5. Mixed hyperlipidemia  Check labs    Lab Results   Component Value Date/Time    Cholesterol, total 151 07/22/2022 09:17 AM    HDL Cholesterol 61 07/22/2022 09:17 AM    LDL, calculated 76.4 07/22/2022 09:17 AM    VLDL, calculated 13.6 07/22/2022 09:17 AM    Triglyceride 68 07/22/2022 09:17 AM    CHOL/HDL Ratio 2.5 07/22/2022 09:17 AM        6.  IFG (impaired fasting glucose)  Check labs  No Diabetic HM Topics for this patient  There is no immunization history for the selected administration types on file for this patient. No results found for: MCACR, MCA1, MCA2, MCA3, MCAU, MCAU2, MCALPOCT  Lab Results   Component Value Date/Time    Hemoglobin A1c 5.8 (H) 07/22/2022 09:17 AM            Prior to Admission medications    Medication Sig Start Date End Date Taking? Authorizing Provider   montelukast (SINGULAIR) 10 mg tablet Take 1 Tablet by mouth daily. 4/5/23 6/28/26 Yes Sylvester Villeda MD   potassium chloride SR (KLOR-CON 10) 10 mEq tablet Take 1 Tablet by mouth daily. 4/5/23  Yes Sylvester Villeda MD   hydroCHLOROthiazide (HYDRODIURIL) 25 mg tablet Take 1 Tablet by mouth daily. 4/5/23  Yes Sylvester Villeda MD   cetirizine (ZYRTEC) 10 mg tablet Take 1 Tablet by mouth daily. 4/5/23  Yes Sylvester Villeda MD   triamcinolone acetonide (KENALOG) 0.1 % ointment Apply  to affected area two (2) times a day. use thin layer 2/1/23  Yes Delmi Acharya MD   Blood Pressure Kit-Extra Large kit 1 Device by Does Not Apply route two (2) times a day. 7/14/21  Yes Kennedy Haas NP   miscellaneous medical supply (Blood Pressure Cuff) misc 1 Device by Does Not Apply route daily. 5/30/21  Yes Camron Norwood MD   hydroCHLOROthiazide (HYDRODIURIL) 25 mg tablet TAKE 1 TABLET BY MOUTH EVERY DAY 3/4/23 4/5/23  Demli Acharya MD   cetirizine (ZYRTEC) 10 mg tablet TAKE 1 TABLET BY MOUTH EVERY DAY IN THE MORNING 3/4/23 4/5/23  Delmi Acharya MD   potassium chloride SR (KLOR-CON 10) 10 mEq tablet Take 1 Tablet by mouth daily. 9/23/22 4/5/23  Delmi Acharya MD   montelukast (SINGULAIR) 10 mg tablet Take 1 Tablet by mouth in the morning.  8/5/22 4/5/23  Dlemi Acharya MD     Allergies   Allergen Reactions    Shellfish Derived Hives and Swelling       Patient Active Problem List   Diagnosis Code    Hypertensive disorder I10     Patient Active Problem List    Diagnosis Date Noted    Hypertensive disorder 2021     Current Outpatient Medications   Medication Sig Dispense Refill    montelukast (SINGULAIR) 10 mg tablet Take 1 Tablet by mouth daily. 90 Tablet 3    potassium chloride SR (KLOR-CON 10) 10 mEq tablet Take 1 Tablet by mouth daily. 90 Tablet 1    hydroCHLOROthiazide (HYDRODIURIL) 25 mg tablet Take 1 Tablet by mouth daily. 90 Tablet 1    cetirizine (ZYRTEC) 10 mg tablet Take 1 Tablet by mouth daily. 90 Tablet 1    triamcinolone acetonide (KENALOG) 0.1 % ointment Apply  to affected area two (2) times a day. use thin layer 30 g 1    Blood Pressure Kit-Extra Large kit 1 Device by Does Not Apply route two (2) times a day. 1 Kit 0    miscellaneous medical supply (Blood Pressure Cuff) misc 1 Device by Does Not Apply route daily. 1 Each 0     Allergies   Allergen Reactions    Shellfish Derived Hives and Swelling     Past Medical History:   Diagnosis Date    Abnormal Pap smear      repeat WNL    Allergies     Anemia     Essential hypertension     chronic hypertension    Sickle cell trait (HCC)     Sickle-cell disease, unspecified     pt and  has trait, fetus does not per pt     Past Surgical History:   Procedure Laterality Date    HX HERNIA REPAIR          [place]  DELIVERY ONLY      2001- fetal distress     Family History   Problem Relation Age of Onset    Alcohol abuse Mother     OSTEOARTHRITIS Father     Elevated Lipids Father     Heart Disease Father     Hypertension Father     Cancer Maternal Aunt      Social History     Tobacco Use    Smoking status: Never    Smokeless tobacco: Never   Substance Use Topics    Alcohol use: Yes     Alcohol/week: 1.0 standard drink     Types: 1 Glasses of wine per week       Review of Systems   Constitutional: Negative. HENT: Negative. Eyes: Negative. Respiratory: Negative. Cardiovascular: Negative. Gastrointestinal: Negative. Genitourinary: Negative. Musculoskeletal: Negative. Skin: Negative. Neurological: Negative. Endo/Heme/Allergies: Negative. Psychiatric/Behavioral: Negative. Objective:     Patient-Reported Vitals 4/5/2023   Patient-Reported Weight 230lb   Patient-Reported LMP 2 weeks        [INSTRUCTIONS:  \"[x]\" Indicates a positive item  \"[]\" Indicates a negative item  -- DELETE ALL ITEMS NOT EXAMINED]    Constitutional: [x] Appears well-developed and well-nourished [x] No apparent distress      [] Abnormal -     Mental status: [x] Alert and awake  [x] Oriented to person/place/time [x] Able to follow commands    [] Abnormal -     Eyes:   EOM    [x]  Normal    [] Abnormal -   Sclera  [x]  Normal    [] Abnormal -          Discharge [x]  None visible   [] Abnormal -     HENT: [x] Normocephalic, atraumatic  [] Abnormal -   [x] Mouth/Throat: Mucous membranes are moist    External Ears [x] Normal  [] Abnormal -    Neck: [x] No visualized mass [] Abnormal -     Pulmonary/Chest: [x] Respiratory effort normal   [x] No visualized signs of difficulty breathing or respiratory distress        [] Abnormal -      Musculoskeletal:   [x] Normal gait with no signs of ataxia         [x] Normal range of motion of neck        [] Abnormal -     Neurological:        [x] No Facial Asymmetry (Cranial nerve 7 motor function) (limited exam due to video visit)          [x] No gaze palsy        [] Abnormal -          Skin:        [x] No significant exanthematous lesions or discoloration noted on facial skin         [] Abnormal -            Psychiatric:       [x] Normal Affect [] Abnormal -        [x] No Hallucinations    Other pertinent observable physical exam findings:-    We discussed the expected course, resolution and complications of the diagnosis(es) in detail. Medication risks, benefits, costs, interactions, and alternatives were discussed as indicated. I advised her to contact the office if her condition worsens, changes or fails to improve as anticipated. She expressed understanding with the diagnosis(es) and plan. Bryson Stewart is a 41 y.o. female  is being evaluated by a Virtual Visit (video visit) encounter to address concerns as mentioned above. A caregiver was present when appropriate. Due to this being a TeleHealth encounter (During DVAYV-74 public health emergency), evaluation of the following organ systems was limited: Vitals/Constitutional/EENT/Resp/CV/GI//MS/Neuro/Skin/Heme-Lymph-Imm. Pursuant to the emergency declaration under the [address] [place] [address] and the [employer] Act, this Virtual Visit was conducted with patient's (and/or legal guardian's) consent, to reduce the patient's risk of exposure to COVID-19 and provide necessary medical care. The patient (and/or legal guardian) has also been advised to contact this office for worsening conditions or problems, and seek emergency medical treatment and/or call 911 if deemed necessary. Patient identification was verified at the start of the visit: YES    Services were provided through a video synchronous discussion virtually to substitute for in-person clinic visit. Patient was located at their homes. Provider located in office    An electronic signature was used to authenticate this note.       Kiera Akbar MD

## 2023-05-20 LAB
ALBUMIN SERPL-MCNC: 3.5 G/DL (ref 3.5–5)
ALBUMIN/GLOB SERPL: 0.9 (ref 1.1–2.2)
ALP SERPL-CCNC: 94 U/L (ref 45–117)
ALT SERPL-CCNC: 32 U/L (ref 12–78)
ANION GAP SERPL CALC-SCNC: 4 MMOL/L (ref 5–15)
APPEARANCE UR: ABNORMAL
AST SERPL-CCNC: 25 U/L (ref 15–37)
BACTERIA URNS QL MICRO: ABNORMAL /HPF
BASOPHILS # BLD: 0 K/UL (ref 0–0.1)
BASOPHILS NFR BLD: 1 % (ref 0–1)
BILIRUB SERPL-MCNC: 0.2 MG/DL (ref 0.2–1)
BILIRUB UR QL: NEGATIVE
BUN SERPL-MCNC: 9 MG/DL (ref 6–20)
BUN/CREAT SERPL: 11 (ref 12–20)
CALCIUM SERPL-MCNC: 9.1 MG/DL (ref 8.5–10.1)
CHLORIDE SERPL-SCNC: 105 MMOL/L (ref 97–108)
CHOLEST SERPL-MCNC: 171 MG/DL
CO2 SERPL-SCNC: 30 MMOL/L (ref 21–32)
COLOR UR: ABNORMAL
CREAT SERPL-MCNC: 0.81 MG/DL (ref 0.55–1.02)
CREAT UR-MCNC: 128 MG/DL
DIFFERENTIAL METHOD BLD: NORMAL
EOSINOPHIL # BLD: 0.2 K/UL (ref 0–0.4)
EOSINOPHIL NFR BLD: 3 % (ref 0–7)
EPITH CASTS URNS QL MICRO: ABNORMAL /LPF
ERYTHROCYTE [DISTWIDTH] IN BLOOD BY AUTOMATED COUNT: 11.6 % (ref 11.5–14.5)
EST. AVERAGE GLUCOSE BLD GHB EST-MCNC: 126 MG/DL
GLOBULIN SER CALC-MCNC: 3.9 G/DL (ref 2–4)
GLUCOSE SERPL-MCNC: 99 MG/DL (ref 65–100)
GLUCOSE UR STRIP.AUTO-MCNC: NEGATIVE MG/DL
HBA1C MFR BLD: 6 % (ref 4–5.6)
HCT VFR BLD AUTO: 36.8 % (ref 35–47)
HDLC SERPL-MCNC: 68 MG/DL
HDLC SERPL: 2.5 (ref 0–5)
HGB BLD-MCNC: 11.8 G/DL (ref 11.5–16)
HGB UR QL STRIP: NEGATIVE
HYALINE CASTS URNS QL MICRO: ABNORMAL /LPF (ref 0–5)
IMM GRANULOCYTES # BLD AUTO: 0 K/UL (ref 0–0.04)
IMM GRANULOCYTES NFR BLD AUTO: 0 % (ref 0–0.5)
KETONES UR QL STRIP.AUTO: NEGATIVE MG/DL
LDLC SERPL CALC-MCNC: 93 MG/DL (ref 0–100)
LEUKOCYTE ESTERASE UR QL STRIP.AUTO: NEGATIVE
LYMPHOCYTES # BLD: 1.8 K/UL (ref 0.8–3.5)
LYMPHOCYTES NFR BLD: 27 % (ref 12–49)
MCH RBC QN AUTO: 31 PG (ref 26–34)
MCHC RBC AUTO-ENTMCNC: 32.1 G/DL (ref 30–36.5)
MCV RBC AUTO: 96.6 FL (ref 80–99)
MICROALBUMIN UR-MCNC: 0.6 MG/DL
MICROALBUMIN/CREAT UR-RTO: 5 MG/G (ref 0–30)
MONOCYTES # BLD: 0.7 K/UL (ref 0–1)
MONOCYTES NFR BLD: 10 % (ref 5–13)
NEUTS SEG # BLD: 4.1 K/UL (ref 1.8–8)
NEUTS SEG NFR BLD: 59 % (ref 32–75)
NITRITE UR QL STRIP.AUTO: NEGATIVE
NRBC # BLD: 0 K/UL (ref 0–0.01)
NRBC BLD-RTO: 0 PER 100 WBC
PH UR STRIP: 7 (ref 5–8)
PLATELET # BLD AUTO: 272 K/UL (ref 150–400)
PMV BLD AUTO: 11.3 FL (ref 8.9–12.9)
POTASSIUM SERPL-SCNC: 4 MMOL/L (ref 3.5–5.1)
PROT SERPL-MCNC: 7.4 G/DL (ref 6.4–8.2)
PROT UR STRIP-MCNC: NEGATIVE MG/DL
RBC # BLD AUTO: 3.81 M/UL (ref 3.8–5.2)
RBC #/AREA URNS HPF: ABNORMAL /HPF (ref 0–5)
SODIUM SERPL-SCNC: 139 MMOL/L (ref 136–145)
SP GR UR REFRACTOMETRY: 1.01 (ref 1–1.03)
TRIGL SERPL-MCNC: 50 MG/DL
URINE CULTURE IF INDICATED: ABNORMAL
UROBILINOGEN UR QL STRIP.AUTO: 0.2 EU/DL (ref 0.2–1)
VLDLC SERPL CALC-MCNC: 10 MG/DL
WBC # BLD AUTO: 6.8 K/UL (ref 3.6–11)
WBC URNS QL MICRO: ABNORMAL /HPF (ref 0–4)

## 2023-05-21 LAB — TSH SERPL DL<=0.05 MIU/L-ACNC: 2.43 UIU/ML (ref 0.45–4.5)

## 2023-06-05 ENCOUNTER — OFFICE VISIT (OUTPATIENT)
Facility: CLINIC | Age: 48
End: 2023-06-05
Payer: COMMERCIAL

## 2023-06-05 VITALS
HEART RATE: 70 BPM | SYSTOLIC BLOOD PRESSURE: 136 MMHG | RESPIRATION RATE: 16 BRPM | WEIGHT: 234 LBS | BODY MASS INDEX: 35.06 KG/M2 | TEMPERATURE: 98 F | DIASTOLIC BLOOD PRESSURE: 88 MMHG | OXYGEN SATURATION: 100 %

## 2023-06-05 DIAGNOSIS — Z71.2 ENCOUNTER TO DISCUSS TEST RESULTS: ICD-10-CM

## 2023-06-05 DIAGNOSIS — I10 ESSENTIAL (PRIMARY) HYPERTENSION: Primary | ICD-10-CM

## 2023-06-05 DIAGNOSIS — E53.9 VITAMIN B DEFICIENCY: ICD-10-CM

## 2023-06-05 PROCEDURE — 99214 OFFICE O/P EST MOD 30 MIN: CPT | Performed by: FAMILY MEDICINE

## 2023-06-05 PROCEDURE — 3075F SYST BP GE 130 - 139MM HG: CPT | Performed by: FAMILY MEDICINE

## 2023-06-05 PROCEDURE — 3079F DIAST BP 80-89 MM HG: CPT | Performed by: FAMILY MEDICINE

## 2023-06-05 RX ORDER — LOSARTAN POTASSIUM 25 MG/1
25 TABLET ORAL DAILY
Qty: 90 TABLET | Refills: 1 | Status: SHIPPED | OUTPATIENT
Start: 2023-06-05

## 2023-06-05 ASSESSMENT — ENCOUNTER SYMPTOMS
GASTROINTESTINAL NEGATIVE: 1
ALLERGIC/IMMUNOLOGIC NEGATIVE: 1
RESPIRATORY NEGATIVE: 1
EYES NEGATIVE: 1

## 2023-06-05 NOTE — PROGRESS NOTES
1. Have you been to the ER, urgent care clinic since your last visit? Hospitalized since your last visit? No    2. Have you seen or consulted any other health care providers outside of the [address] [place] [address] since your last visit? Include any pap smears or colon screening.  No        Chief Complaint   Patient presents with    Follow-up     Lab results

## 2023-06-05 NOTE — PROGRESS NOTES
2023   Kelly Alba (: 1975) is a 41 y.o. female, established patient, here for evaluation of the following chief complaint(s):  Follow-up (Lab results )     ASSESSMENT/PLAN:  Below is the assessment and plan developed based on review of pertinent history, physical exam, labs, studies, and medications. 1. Essential (primary) hypertension  -     losartan (COZAAR) 25 MG tablet; Take 1 tablet by mouth daily, Disp-90 tablet, R-1Normal  2. Vitamin B deficiency  -     Vitamin B12 & Folate; Future  3. BMI 35.0-35.9,adult  4. Encounter to discuss test results    Return in about 6 months (around 2023) for LABS. SUBJECTIVE/OBJECTIVE:  HPI     Diagnoses and all orders for this visit:  Essential (primary) hypertension  The patient presents today for HTN follow-up. Patient reports fluctuating blood pressures at home. I will add losartan 25 mg. Advised patient to keep blood pressure log. Taking medications daily w/o complications. Home BP readings range from did not bring log. SIde effects of meds: None  Patient trying to follow low salt diet. Lab Results   Component Value Date/Time     2023 09:00 AM    K 4.0 2023 09:00 AM     2023 09:00 AM    CO2 30 2023 09:00 AM    BUN 9 2023 09:00 AM    GFRAA >60 2022 09:17 AM      Vitamin B deficiency  Patient requesting labs to check vitamin B12 level. She has history of vitamin B12 deficiency. We discussed patient will come in to the office for vitamin B12 supplement injections if levels are low. BMI 35.0-35.9,adult  BMI was elevated today, and weight loss plan recommended is : conventional weight loss and daily exercise regimen. Encounter to discuss test results  All lab results discussed in detail with the patient today    No results found for any visits on 23. Review of Systems   Constitutional: Negative. HENT: Negative. Eyes: Negative. Respiratory: Negative.

## 2023-06-21 DIAGNOSIS — L30.8 OTHER SPECIFIED DERMATITIS: ICD-10-CM

## 2023-06-21 DIAGNOSIS — B35.1 TINEA UNGUIUM: ICD-10-CM

## 2023-06-22 NOTE — TELEPHONE ENCOUNTER
PCP: Valeri Hu MD    Last appt:  06/05/23      No future appointments.     Requested Prescriptions     Pending Prescriptions Disp Refills    triamcinolone (KENALOG) 0.1 % ointment [Pharmacy Med Name: TRIAMCINOLONE 0.1% OINTMENT] 30 g 1     Sig: APPLY A THIN LAYER TO AFFECTED AREA TWICE A DAY       Prior labs and Blood pressures:  BP Readings from Last 3 Encounters:   06/05/23 136/88   09/13/22 (!) 139/90   10/21/21 112/80     Lab Results   Component Value Date/Time     05/19/2023 09:00 AM    K 4.0 05/19/2023 09:00 AM     05/19/2023 09:00 AM    CO2 30 05/19/2023 09:00 AM    BUN 9 05/19/2023 09:00 AM    GFRAA >60 07/22/2022 09:17 AM     No results found for: HBA1C, [id number]  Lab Results   Component Value Date/Time    CHOL 171 05/19/2023 09:00 AM    HDL 68 05/19/2023 09:00 AM    VLDL 17 10/21/2021 02:46 PM     No results found for: VITD3, VD3RIA    Lab Results   Component Value Date/Time    TSH 2.430 05/19/2023 09:00 AM

## 2023-07-14 ENCOUNTER — TELEPHONE (OUTPATIENT)
Facility: CLINIC | Age: 48
End: 2023-07-14

## 2023-07-14 NOTE — TELEPHONE ENCOUNTER
----- Message from Jarred Chavez sent at 6/23/2023  3:34 PM EDT -----  Subject: Appointment Request    Reason for Call: New Patient/New to Provider Appointment needed: Routine   Physical Exam    QUESTIONS    Reason for appointment request? No appointments available during search     Additional Information for Provider? Patient would like to schedule an   appointment with any provider that is accepting new patients in your   office.  Please call her and schedule with her.  ---------------------------------------------------------------------------  --------------  Meera MONZON  [phone]; OK to leave message on voicemail  ---------------------------------------------------------------------------  --------------  SCRIPT ANSWERS

## 2023-07-21 ENCOUNTER — OFFICE VISIT (OUTPATIENT)
Facility: CLINIC | Age: 48
End: 2023-07-21
Payer: COMMERCIAL

## 2023-07-21 VITALS
BODY MASS INDEX: 35.37 KG/M2 | HEIGHT: 68 IN | HEART RATE: 75 BPM | SYSTOLIC BLOOD PRESSURE: 123 MMHG | DIASTOLIC BLOOD PRESSURE: 82 MMHG | WEIGHT: 233.4 LBS | OXYGEN SATURATION: 100 % | TEMPERATURE: 98.1 F | RESPIRATION RATE: 18 BRPM

## 2023-07-21 DIAGNOSIS — R73.03 PREDIABETES: ICD-10-CM

## 2023-07-21 DIAGNOSIS — I10 PRIMARY HYPERTENSION: Primary | ICD-10-CM

## 2023-07-21 DIAGNOSIS — Z12.11 SCREENING FOR COLON CANCER: ICD-10-CM

## 2023-07-21 DIAGNOSIS — L30.9 ECZEMA, UNSPECIFIED TYPE: ICD-10-CM

## 2023-07-21 DIAGNOSIS — T78.40XS ALLERGY, SEQUELA: ICD-10-CM

## 2023-07-21 DIAGNOSIS — R53.83 OTHER FATIGUE: ICD-10-CM

## 2023-07-21 DIAGNOSIS — I10 PRIMARY HYPERTENSION: ICD-10-CM

## 2023-07-21 PROBLEM — T78.40XA ALLERGIES: Status: ACTIVE | Noted: 2023-07-21

## 2023-07-21 PROCEDURE — 99204 OFFICE O/P NEW MOD 45 MIN: CPT | Performed by: STUDENT IN AN ORGANIZED HEALTH CARE EDUCATION/TRAINING PROGRAM

## 2023-07-21 PROCEDURE — 3079F DIAST BP 80-89 MM HG: CPT | Performed by: STUDENT IN AN ORGANIZED HEALTH CARE EDUCATION/TRAINING PROGRAM

## 2023-07-21 PROCEDURE — 3074F SYST BP LT 130 MM HG: CPT | Performed by: STUDENT IN AN ORGANIZED HEALTH CARE EDUCATION/TRAINING PROGRAM

## 2023-07-21 RX ORDER — BIOTIN 1 MG
1 TABLET ORAL DAILY
COMMUNITY

## 2023-07-21 RX ORDER — LORATADINE 10 MG/1
10 TABLET ORAL DAILY
COMMUNITY
End: 2023-07-21

## 2023-07-21 RX ORDER — CYANOCOBALAMIN (VITAMIN B-12) 5000 MCG
1 TABLET,DISINTEGRATING ORAL DAILY
COMMUNITY

## 2023-07-21 RX ORDER — KRILL/OM-3/DHA/EPA/PHOSPHO/AST 500-110 MG
2 CAPSULE ORAL DAILY
COMMUNITY

## 2023-07-21 SDOH — HEALTH STABILITY: PHYSICAL HEALTH: ON AVERAGE, HOW MANY MINUTES DO YOU ENGAGE IN EXERCISE AT THIS LEVEL?: 30 MIN

## 2023-07-21 SDOH — ECONOMIC STABILITY: HOUSING INSECURITY
IN THE LAST 12 MONTHS, WAS THERE A TIME WHEN YOU DID NOT HAVE A STEADY PLACE TO SLEEP OR SLEPT IN A SHELTER (INCLUDING NOW)?: NO

## 2023-07-21 SDOH — ECONOMIC STABILITY: FOOD INSECURITY: WITHIN THE PAST 12 MONTHS, YOU WORRIED THAT YOUR FOOD WOULD RUN OUT BEFORE YOU GOT MONEY TO BUY MORE.: NEVER TRUE

## 2023-07-21 SDOH — ECONOMIC STABILITY: INCOME INSECURITY: HOW HARD IS IT FOR YOU TO PAY FOR THE VERY BASICS LIKE FOOD, HOUSING, MEDICAL CARE, AND HEATING?: NOT HARD AT ALL

## 2023-07-21 SDOH — ECONOMIC STABILITY: FOOD INSECURITY: WITHIN THE PAST 12 MONTHS, THE FOOD YOU BOUGHT JUST DIDN'T LAST AND YOU DIDN'T HAVE MONEY TO GET MORE.: NEVER TRUE

## 2023-07-21 SDOH — HEALTH STABILITY: PHYSICAL HEALTH: ON AVERAGE, HOW MANY DAYS PER WEEK DO YOU ENGAGE IN MODERATE TO STRENUOUS EXERCISE (LIKE A BRISK WALK)?: 7 DAYS

## 2023-07-21 ASSESSMENT — PATIENT HEALTH QUESTIONNAIRE - PHQ9
SUM OF ALL RESPONSES TO PHQ QUESTIONS 1-9: 0
9. THOUGHTS THAT YOU WOULD BE BETTER OFF DEAD, OR OF HURTING YOURSELF: 0
SUM OF ALL RESPONSES TO PHQ QUESTIONS 1-9: 0
3. TROUBLE FALLING OR STAYING ASLEEP: 0
1. LITTLE INTEREST OR PLEASURE IN DOING THINGS: 0
5. POOR APPETITE OR OVEREATING: 0
6. FEELING BAD ABOUT YOURSELF - OR THAT YOU ARE A FAILURE OR HAVE LET YOURSELF OR YOUR FAMILY DOWN: 0
10. IF YOU CHECKED OFF ANY PROBLEMS, HOW DIFFICULT HAVE THESE PROBLEMS MADE IT FOR YOU TO DO YOUR WORK, TAKE CARE OF THINGS AT HOME, OR GET ALONG WITH OTHER PEOPLE: 0
8. MOVING OR SPEAKING SO SLOWLY THAT OTHER PEOPLE COULD HAVE NOTICED. OR THE OPPOSITE, BEING SO FIGETY OR RESTLESS THAT YOU HAVE BEEN MOVING AROUND A LOT MORE THAN USUAL: 0
SUM OF ALL RESPONSES TO PHQ9 QUESTIONS 1 & 2: 0
2. FEELING DOWN, DEPRESSED OR HOPELESS: 0
SUM OF ALL RESPONSES TO PHQ QUESTIONS 1-9: 0
4. FEELING TIRED OR HAVING LITTLE ENERGY: 0
SUM OF ALL RESPONSES TO PHQ QUESTIONS 1-9: 0
7. TROUBLE CONCENTRATING ON THINGS, SUCH AS READING THE NEWSPAPER OR WATCHING TELEVISION: 0

## 2023-07-21 ASSESSMENT — ENCOUNTER SYMPTOMS
DIARRHEA: 1
COUGH: 0
SHORTNESS OF BREATH: 0
ABDOMINAL PAIN: 0
SORE THROAT: 0
FACIAL SWELLING: 0

## 2023-07-21 ASSESSMENT — LIFESTYLE VARIABLES
HOW MANY STANDARD DRINKS CONTAINING ALCOHOL DO YOU HAVE ON A TYPICAL DAY: PATIENT DOES NOT DRINK
HOW OFTEN DO YOU HAVE A DRINK CONTAINING ALCOHOL: NEVER

## 2023-07-21 ASSESSMENT — ANXIETY QUESTIONNAIRES
GAD7 TOTAL SCORE: 0
6. BECOMING EASILY ANNOYED OR IRRITABLE: 0
2. NOT BEING ABLE TO STOP OR CONTROL WORRYING: 0
5. BEING SO RESTLESS THAT IT IS HARD TO SIT STILL: 0
3. WORRYING TOO MUCH ABOUT DIFFERENT THINGS: 0
1. FEELING NERVOUS, ANXIOUS, OR ON EDGE: 0
7. FEELING AFRAID AS IF SOMETHING AWFUL MIGHT HAPPEN: 0
4. TROUBLE RELAXING: 0
IF YOU CHECKED OFF ANY PROBLEMS ON THIS QUESTIONNAIRE, HOW DIFFICULT HAVE THESE PROBLEMS MADE IT FOR YOU TO DO YOUR WORK, TAKE CARE OF THINGS AT HOME, OR GET ALONG WITH OTHER PEOPLE: NOT DIFFICULT AT ALL

## 2023-07-21 NOTE — PROGRESS NOTES
Swati García (: 1975) is a 41 y.o. female, New patient patient, here for evaluation of the following chief complaint(s):  New Patient and Hypertension     Previous pcp: Dr. Hakeem Delarosa ws previously seeing Dr. Laura Acosta     ASSESSMENT/PLAN:  Below is the assessment and plan developed based on review of pertinent history, physical exam, labs, studies, and medications. 1. Primary hypertension  -     Basic Metabolic Panel; Future  2. Prediabetes  3. Other fatigue  -     Vitamin B12; Future  4. Screening for colon cancer  -     Cologuard (Fecal DNA Colorectal Cancer Screening)  5. Eczema, unspecified type  6. Allergy, sequela    New patient, presents to clinic establish care. Blood pressures controlled. We will recheck kidney function/potassium levels given patient was recently started losartan. Reviewed other blood work from May. Counseled on lifestyle modifications including diet and exercise. Return in about 3 months (around 10/21/2023) for follow up. SUBJECTIVE/OBJECTIVE:  HPI    Swati García is a 47-year-old who presents to clinic to establish care. She has a history of hypertension, states she was recently started on losartan in . Checking blood pressures at home, running in the 120s/80s to 85's. She states that she is on vitamin B12, but was previously getting vitamin B12 shots which helped with her energy levels. She also has a history of prediabetes, last HbA1c in May was 6.0. She has eczema usually involves back, legs, buttocks. Lesions occur during weather changes. She also gets allergies, takes Zyrtec as well as Singulair. She had mammogram and pap smear, this year which were normal, she follows at [facility]. She states she does try to exercise, walks does sit ups and jump ropes. She works at [employer].      Allergies   Allergen Reactions    Shellfish Allergy Hives and Swelling     Current Outpatient Medications   Medication Sig

## 2023-07-24 ENCOUNTER — TELEPHONE (OUTPATIENT)
Facility: CLINIC | Age: 48
End: 2023-07-24

## 2023-07-24 NOTE — TELEPHONE ENCOUNTER
Patient called and stated that she is having dizzy spells and she believes its for the Losartan and would like something else to try.

## 2023-07-25 DIAGNOSIS — I10 PRIMARY HYPERTENSION: Primary | ICD-10-CM

## 2023-07-25 RX ORDER — AMLODIPINE BESYLATE 5 MG/1
5 TABLET ORAL DAILY
Qty: 90 TABLET | Refills: 1 | Status: SHIPPED | OUTPATIENT
Start: 2023-07-25

## 2023-10-17 ENCOUNTER — TELEPHONE (OUTPATIENT)
Facility: CLINIC | Age: 48
End: 2023-10-17

## 2023-10-17 NOTE — TELEPHONE ENCOUNTER
Called patient advising that there aren't any appointments available until December. Patient stated she can come in for blood work and would like to add potassium to her order and have a clinical staff call to go over her results. She stated that she wants to know her Potassium levels because she currently takes Potassium pills and would like to know if she can stop taking them. Please advise and I will call the patient back.   Thanks

## 2023-10-18 ENCOUNTER — TELEPHONE (OUTPATIENT)
Facility: CLINIC | Age: 48
End: 2023-10-18

## 2023-10-18 NOTE — TELEPHONE ENCOUNTER
----- Message from Chemo Abel sent at 38/45/2094 10:11 AM EDT -----  Subject: Referral Request    Reason for referral request? Patient requesting to order blood draw to   check her potassium levels  Provider patient wants to be referred to(if known):     Provider Phone Number(if known):     Additional Information for Provider?   ---------------------------------------------------------------------------  --------------  Pascale Hubbell Don    [phone]; OK to leave message on voicemail  ---------------------------------------------------------------------------  --------------

## 2023-11-21 ENCOUNTER — TELEMEDICINE (OUTPATIENT)
Facility: CLINIC | Age: 48
End: 2023-11-21
Payer: COMMERCIAL

## 2023-11-21 DIAGNOSIS — R05.1 ACUTE COUGH: ICD-10-CM

## 2023-11-21 DIAGNOSIS — R06.2 WHEEZING: ICD-10-CM

## 2023-11-21 DIAGNOSIS — J40 BRONCHITIS: Primary | ICD-10-CM

## 2023-11-21 PROCEDURE — 99214 OFFICE O/P EST MOD 30 MIN: CPT | Performed by: STUDENT IN AN ORGANIZED HEALTH CARE EDUCATION/TRAINING PROGRAM

## 2023-11-21 RX ORDER — BENZONATATE 100 MG/1
100 CAPSULE ORAL 3 TIMES DAILY PRN
Qty: 15 CAPSULE | Refills: 0 | Status: SHIPPED | OUTPATIENT
Start: 2023-11-21

## 2023-11-21 RX ORDER — ALBUTEROL SULFATE 90 UG/1
2 AEROSOL, METERED RESPIRATORY (INHALATION) 4 TIMES DAILY PRN
Qty: 54 G | Refills: 1 | Status: SHIPPED | OUTPATIENT
Start: 2023-11-21

## 2023-11-21 RX ORDER — AZITHROMYCIN 500 MG/1
500 TABLET, FILM COATED ORAL DAILY
Qty: 3 TABLET | Refills: 0 | Status: SHIPPED | OUTPATIENT
Start: 2023-11-21 | End: 2023-11-24

## 2023-11-21 NOTE — PROGRESS NOTES
1. \"Have you been to the ER, urgent care clinic since your last visit? Hospitalized since your last visit? \" No    2. \"Have you seen or consulted any other health care providers outside of the [address] [place] [address] since your last visit? \" No     3. For patients aged 43-73: Has the patient had a colonoscopy / FIT/ Cologuard? No      If the patient is female:    4. For patients aged 43-66: Has the patient had a mammogram within the past 2 years? Yes - Care Gap present. Rooming MA/LPN to request most recent results    Mammogram done at [facility]  in Nov 2023     5. For patients aged 21-65: Has the patient had a pap smear? Yes - Care Gap present.  Rooming MA/LPN to request most recent results    Papsmear done at [facility] in Nov 2023

## 2023-11-21 NOTE — PROGRESS NOTES
Brigette Hannah, was evaluated through a synchronous (real-time) audio-video encounter. The patient (or guardian if applicable) is aware that this is a billable service, which includes applicable co-pays. This Virtual Visit was conducted with patient's (and/or legal guardian's) consent. Patient identification was verified, and a caregiver was present when appropriate. The patient was located at Other: work place in  [email or web address] was located at [facility] ([address] [place] [address]): [address] [place] [address]. [address] [employer] [facility],  [address] [place] [address] [place]      Brigette Hannah (:  1975) is a Established patient, presenting virtually for evaluation of the following:    Assessment & Plan   Below is the assessment and plan developed based on review of pertinent history, physical exam, labs, studies, and medications. 1. Bronchitis  -     azithromycin (ZITHROMAX) 500 MG tablet; Take 1 tablet by mouth daily for 3 days, Disp-3 tablet, R-0Normal  -     benzonatate (TESSALON) 100 MG capsule; Take 1 capsule by mouth 3 times daily as needed for Cough, Disp-15 capsule, R-0Normal  -     albuterol sulfate HFA (VENTOLIN HFA) 108 (90 Base) MCG/ACT inhaler; Inhale 2 puffs into the lungs 4 times daily as needed for Wheezing or Shortness of Breath, Disp-54 g, R-1Normal  2. Wheezing  -     albuterol sulfate HFA (VENTOLIN HFA) 108 (90 Base) MCG/ACT inhaler; Inhale 2 puffs into the lungs 4 times daily as needed for Wheezing or Shortness of Breath, Disp-54 g, R-1Normal  3. Acute cough  -     benzonatate (TESSALON) 100 MG capsule; Take 1 capsule by mouth 3 times daily as needed for Cough, Disp-15 capsule, R-0Normal    Return for needs to make follow up with next availability . Subjective      Symptoms likely secondary to bronchitis, management as above. Patient was told that if her symptoms worsen, with fever, shortness of breath or severe wheezing she needs to go to the urgent care.     Subjective   HPI    She complains of hoarseness of voice, cough, runny

## 2023-11-22 DIAGNOSIS — J40 BRONCHITIS: Primary | ICD-10-CM

## 2023-11-22 RX ORDER — METHYLPREDNISOLONE 4 MG/1
TABLET ORAL
Qty: 1 KIT | Refills: 0 | Status: SHIPPED | OUTPATIENT
Start: 2023-11-22 | End: 2023-11-28

## 2023-12-13 NOTE — TELEPHONE ENCOUNTER
Patient is requesting a refill on potassium chloride 10 meq extended release tabs to [facility]

## 2023-12-15 RX ORDER — POTASSIUM CHLORIDE 750 MG/1
10 TABLET, FILM COATED, EXTENDED RELEASE ORAL DAILY
Qty: 30 TABLET | Refills: 0 | Status: SHIPPED | OUTPATIENT
Start: 2023-12-15 | End: 2023-12-16 | Stop reason: SDUPTHER

## 2023-12-16 RX ORDER — POTASSIUM CHLORIDE 750 MG/1
10 TABLET, FILM COATED, EXTENDED RELEASE ORAL DAILY
Qty: 30 TABLET | Refills: 0 | Status: SHIPPED | OUTPATIENT
Start: 2023-12-16

## 2023-12-16 NOTE — PROGRESS NOTES
Patient used emergency line for on call physician to ask for potassium refill.     Left voicemail saying Nursing staff for Dr Infante said she would send refill. I see it was sent to [facility]. Sending to [facility] as patient requested in voicemail.

## 2024-01-27 LAB
BUN SERPL-MCNC: 9 MG/DL (ref 6–24)
BUN/CREAT SERPL: 13 (ref 9–23)
CALCIUM SERPL-MCNC: 9.1 MG/DL (ref 8.7–10.2)
CHLORIDE SERPL-SCNC: 102 MMOL/L (ref 96–106)
CO2 SERPL-SCNC: 25 MMOL/L (ref 20–29)
CREAT SERPL-MCNC: 0.69 MG/DL (ref 0.57–1)
EGFRCR SERPLBLD CKD-EPI 2021: 108 ML/MIN/1.73
GLUCOSE SERPL-MCNC: 93 MG/DL (ref 70–99)
POTASSIUM SERPL-SCNC: 3.8 MMOL/L (ref 3.5–5.2)
SODIUM SERPL-SCNC: 140 MMOL/L (ref 134–144)
VIT B12 SERPL-MCNC: 1139 PG/ML (ref 232–1245)

## 2024-02-01 RX ORDER — CETIRIZINE HYDROCHLORIDE 10 MG/1
10 TABLET ORAL EVERY MORNING
Qty: 90 TABLET | Refills: 1 | Status: SHIPPED | OUTPATIENT
Start: 2024-02-01

## 2024-02-01 RX ORDER — HYDROCHLOROTHIAZIDE 25 MG/1
25 TABLET ORAL DAILY
Qty: 90 TABLET | Refills: 1 | Status: SHIPPED | OUTPATIENT
Start: 2024-02-01

## 2024-07-19 PROBLEM — N94.0 MITTELSCHMERZ: Status: ACTIVE | Noted: 2021-05-05

## 2024-09-20 RX ORDER — CETIRIZINE HYDROCHLORIDE 10 MG/1
10 TABLET ORAL EVERY MORNING
Qty: 90 TABLET | Refills: 1 | Status: SHIPPED | OUTPATIENT
Start: 2024-09-20

## 2024-09-20 RX ORDER — HYDROCHLOROTHIAZIDE 25 MG/1
25 TABLET ORAL DAILY
Qty: 90 TABLET | Refills: 1 | Status: SHIPPED | OUTPATIENT
Start: 2024-09-20

## 2024-10-08 ENCOUNTER — TELEPHONE (OUTPATIENT)
Facility: CLINIC | Age: 49
End: 2024-10-08

## 2024-10-08 NOTE — TELEPHONE ENCOUNTER
----- Message from Inez RAMSAY sent at 10/7/2024  5:01 PM EDT -----  Regarding: [facility] Appointment Request  [facility] Appointment Request    Patient needs appointment for [facility] Appointment Type: Existing Condition Follow Up.    Patient Requested Dates(s): cannot do Monday or Tuesday (any day)  Patient Requested Time: afternoon  Provider Name: No Infante MD    Reason for Appointment Request: Established Patient - No appointments available during search  --------------------------------------------------------------------------------------------------------------------------    Relationship to Patient: Self     Call Back Information: OK to leave message on voicemail  Preferred Call Back Number: Phone [phone]

## 2025-01-22 ENCOUNTER — HOSPITAL ENCOUNTER (EMERGENCY)
Facility: HOSPITAL | Age: 50
Discharge: HOME | End: 2025-01-22
Attending: EMERGENCY MEDICINE
Payer: COMMERCIAL

## 2025-01-22 VITALS
RESPIRATION RATE: 16 BRPM | BODY MASS INDEX: 36.37 KG/M2 | DIASTOLIC BLOOD PRESSURE: 89 MMHG | TEMPERATURE: 98.8 F | HEART RATE: 104 BPM | SYSTOLIC BLOOD PRESSURE: 157 MMHG | WEIGHT: 240 LBS | HEIGHT: 68 IN | OXYGEN SATURATION: 97 %

## 2025-01-22 DIAGNOSIS — J06.9 URI WITH COUGH AND CONGESTION: ICD-10-CM

## 2025-01-22 DIAGNOSIS — J10.1 INFLUENZA A: Primary | ICD-10-CM

## 2025-01-22 LAB
FLUAV RNA SPEC QL NAA+PROBE: DETECTED
FLUBV RNA SPEC QL NAA+PROBE: NOT DETECTED
SARS-COV-2 RNA RESP QL NAA+PROBE: NOT DETECTED
SOURCE: ABNORMAL

## 2025-01-22 PROCEDURE — 99283 EMERGENCY DEPT VISIT LOW MDM: CPT

## 2025-01-22 PROCEDURE — 87636 SARSCOV2 & INF A&B AMP PRB: CPT

## 2025-01-22 RX ORDER — DEXTROMETHORPHAN HBR, GUAIFENESIN 60; 1200 MG/1; MG/1
1 TABLET, EXTENDED RELEASE ORAL EVERY 12 HOURS PRN
Qty: 28 TABLET | Refills: 0 | Status: SHIPPED | OUTPATIENT
Start: 2025-01-22

## 2025-01-22 RX ORDER — IBUPROFEN 600 MG/1
600 TABLET, FILM COATED ORAL EVERY 6 HOURS PRN
Qty: 21 TABLET | Refills: 0 | Status: SHIPPED | OUTPATIENT
Start: 2025-01-22

## 2025-01-22 RX ORDER — ACETAMINOPHEN 500 MG
1000 TABLET ORAL EVERY 6 HOURS PRN
Qty: 40 TABLET | Refills: 0 | Status: SHIPPED | OUTPATIENT
Start: 2025-01-22

## 2025-01-22 ASSESSMENT — PAIN - FUNCTIONAL ASSESSMENT: PAIN_FUNCTIONAL_ASSESSMENT: NONE - DENIES PAIN

## 2025-01-25 NOTE — ED PROVIDER NOTES
[facility]  EMERGENCY DEPARTMENT ENCOUNTER      Pt Name: Miryam River  MRN: [id number]  Birthdate 1975  Date of evaluation: 2025  Provider: Harjit Dominguez MD    CHIEF COMPLAINT       Chief Complaint   Patient presents with    Cough         HISTORY OF PRESENT ILLNESS    49 y.o. female presents with cough and congestion over the last 4 days. No fever.             Review of External Medical Records:     Nursing Notes were reviewed.    REVIEW OF SYSTEMS       Review of Systems    Except as noted above the remainder of the review of systems was reviewed and negative.       PAST MEDICAL HISTORY     Past Medical History:   Diagnosis Date    Abnormal Pap smear      repeat WNL    Allergies     Anemia     Essential hypertension     chronic hypertension    Sickle cell trait (HCC)     Sickle-cell disease, unspecified     pt and  has trait, fetus does not per pt         SURGICAL HISTORY       Past Surgical History:   Procedure Laterality Date     DELIVERY ONLY      - fetal distress    HERNIA REPAIR               CURRENT MEDICATIONS       Discharge Medication List as of 2025  3:55 PM        CONTINUE these medications which have NOT CHANGED    Details   hydroCHLOROthiazide (HYDRODIURIL) 25 MG tablet Take 1 tablet by mouth daily, Disp-90 tablet, R-1Normal      cetirizine (ZYRTEC) 10 MG tablet Take 1 tablet by mouth every morning, Disp-90 tablet, R-1Normal      potassium chloride (KLOR-CON) 10 MEQ extended release tablet Take 1 tablet by mouth daily NEEDS TO GET BLOOD WORK BEFORE FURTHER REFILLS, Disp-30 tablet, R-0Normal      benzonatate (TESSALON) 100 MG capsule Take 1 capsule by mouth 3 times daily as needed for Cough, Disp-15 capsule, R-0Normal      albuterol sulfate HFA (VENTOLIN HFA) 108 (90 Base) MCG/ACT inhaler Inhale 2 puffs into the lungs 4 times daily as needed for Wheezing or Shortness of Breath, Disp-54 g, R-1Normal      amLODIPine (NORVASC) 5 MG tablet